# Patient Record
Sex: MALE | Race: BLACK OR AFRICAN AMERICAN | NOT HISPANIC OR LATINO | Employment: OTHER | URBAN - METROPOLITAN AREA
[De-identification: names, ages, dates, MRNs, and addresses within clinical notes are randomized per-mention and may not be internally consistent; named-entity substitution may affect disease eponyms.]

---

## 2017-11-02 ENCOUNTER — HISTORICAL (OUTPATIENT)
Dept: ADMINISTRATIVE | Facility: HOSPITAL | Age: 62
End: 2017-11-02

## 2017-11-02 ENCOUNTER — HISTORICAL (OUTPATIENT)
Dept: INFUSION THERAPY | Facility: HOSPITAL | Age: 62
End: 2017-11-02

## 2017-11-02 LAB
ABS NEUT (OLG): 3.9 X10(3)/MCL (ref 2.1–9.2)
ALBUMIN SERPL-MCNC: 3.5 GM/DL (ref 3.4–5)
ALBUMIN/GLOB SERPL: 1 RATIO (ref 1–2)
ALP SERPL-CCNC: 82 UNIT/L (ref 45–117)
ALT SERPL-CCNC: 19 UNIT/L (ref 12–78)
APPEARANCE, UA: CLEAR
AST SERPL-CCNC: 24 UNIT/L (ref 15–37)
BACTERIA #/AREA URNS AUTO: ABNORMAL /[HPF]
BASOPHILS # BLD AUTO: 0.01 X10(3)/MCL
BASOPHILS NFR BLD AUTO: 0 % (ref 0–1)
BILIRUB SERPL-MCNC: 0.4 MG/DL (ref 0.2–1)
BILIRUB UR QL STRIP: NEGATIVE
BILIRUBIN DIRECT+TOT PNL SERPL-MCNC: 0.2 MG/DL
BILIRUBIN DIRECT+TOT PNL SERPL-MCNC: 0.2 MG/DL
BUN SERPL-MCNC: 19 MG/DL (ref 7–18)
CALCIUM SERPL-MCNC: 9.2 MG/DL (ref 8.5–10.1)
CHLORIDE SERPL-SCNC: 110 MMOL/L (ref 98–107)
CO2 SERPL-SCNC: 23 MMOL/L (ref 21–32)
COLOR UR: ABNORMAL
CREAT SERPL-MCNC: 1.2 MG/DL (ref 0.6–1.3)
CREAT UR-MCNC: 48 MG/DL
CROSSMATCH INTERPRETATION: NORMAL
DEPRECATED CALCIDIOL+CALCIFEROL SERPL-MC: 35.36 NG/ML (ref 30–80)
EOSINOPHIL # BLD AUTO: 0.09 10*3/UL
EOSINOPHIL NFR BLD AUTO: 2 % (ref 0–5)
ERYTHROCYTE [DISTWIDTH] IN BLOOD BY AUTOMATED COUNT: 19.7 % (ref 11.5–14.5)
EST. AVERAGE GLUCOSE BLD GHB EST-MCNC: 134 MG/DL
FERRITIN SERPL-MCNC: 3.2 NG/ML (ref 26–388)
GLOBULIN SER-MCNC: 4.7 GM/ML (ref 2.3–3.5)
GLUCOSE (UA): NORMAL
GLUCOSE SERPL-MCNC: 78 MG/DL (ref 74–106)
HBA1C MFR BLD: 6.3 % (ref 4.2–6.3)
HCT VFR BLD AUTO: 24.5 % (ref 40–51)
HCT VFR BLD AUTO: 27.9 % (ref 40–51)
HGB BLD-MCNC: 6.7 GM/DL (ref 13.5–17.5)
HGB BLD-MCNC: 8.1 GM/DL (ref 13.5–17.5)
HGB UR QL STRIP: NEGATIVE
HYALINE CASTS #/AREA URNS LPF: ABNORMAL /[LPF]
IMM GRANULOCYTES # BLD AUTO: 0.02 10*3/UL
IMM GRANULOCYTES NFR BLD AUTO: 0 %
IRON SATN MFR SERPL: 3.1 % (ref 15–50)
IRON SERPL-MCNC: 16 MCG/DL (ref 65–175)
KETONES UR QL STRIP: NEGATIVE
LEUKOCYTE ESTERASE UR QL STRIP: NEGATIVE
LYMPHOCYTES # BLD AUTO: 1.27 X10(3)/MCL
LYMPHOCYTES NFR BLD AUTO: 21 % (ref 15–40)
MCH RBC QN AUTO: 18.6 PG (ref 26–34)
MCHC RBC AUTO-ENTMCNC: 27.3 GM/DL (ref 31–37)
MCV RBC AUTO: 68.1 FL (ref 80–100)
MICROALBUMIN UR-MCNC: 202 MG/L (ref 0–19)
MICROALBUMIN/CREAT RATIO PNL UR: 420.8 MCG/MG CR (ref 0–29)
MONOCYTES # BLD AUTO: 0.66 X10(3)/MCL
MONOCYTES NFR BLD AUTO: 11 % (ref 4–12)
NEUTROPHILS # BLD AUTO: 3.9 X10(3)/MCL
NEUTROPHILS NFR BLD AUTO: 66 X10(3)/MCL
NITRITE UR QL STRIP: NEGATIVE
PH UR STRIP: 5.5 [PH] (ref 4.5–8)
PLATELET # BLD AUTO: 319 X10(3)/MCL (ref 130–400)
PMV BLD AUTO: 8.9 FL (ref 7.4–10.4)
POTASSIUM SERPL-SCNC: 4.4 MMOL/L (ref 3.5–5.1)
PRODUCT READY: NORMAL
PROT SERPL-MCNC: 8.2 GM/DL (ref 6.4–8.2)
PROT UR QL STRIP: 20 MG/DL
RBC # BLD AUTO: 3.6 X10(6)/MCL (ref 4.5–5.9)
RBC #/AREA URNS AUTO: ABNORMAL /[HPF]
SODIUM SERPL-SCNC: 138 MMOL/L (ref 136–145)
SP GR UR STRIP: 1 (ref 1–1.03)
SQUAMOUS #/AREA URNS LPF: ABNORMAL /[LPF]
TIBC SERPL-MCNC: 523 MCG/DL (ref 250–450)
TRANSFERRIN SERPL-MCNC: 370 MG/DL (ref 200–360)
UROBILINOGEN UR STRIP-ACNC: NORMAL MG/DL
WBC # SPEC AUTO: 6 X10(3)/MCL (ref 4.5–11)
WBC #/AREA URNS AUTO: ABNORMAL /HPF

## 2018-05-03 ENCOUNTER — HISTORICAL (OUTPATIENT)
Dept: ADMINISTRATIVE | Facility: HOSPITAL | Age: 63
End: 2018-05-03

## 2018-05-03 LAB
ABS NEUT (OLG): 1.96 X10(3)/MCL (ref 2.1–9.2)
ALBUMIN SERPL-MCNC: 3.5 GM/DL (ref 3.4–5)
ALBUMIN/GLOB SERPL: 1 RATIO (ref 1–2)
ALP SERPL-CCNC: 92 UNIT/L (ref 45–117)
ALT SERPL-CCNC: 35 UNIT/L (ref 12–78)
AST SERPL-CCNC: 43 UNIT/L (ref 15–37)
BASOPHILS # BLD AUTO: 0.01 X10(3)/MCL
BASOPHILS NFR BLD AUTO: 0 %
BILIRUB SERPL-MCNC: 0.4 MG/DL (ref 0.2–1)
BILIRUBIN DIRECT+TOT PNL SERPL-MCNC: 0.2 MG/DL
BILIRUBIN DIRECT+TOT PNL SERPL-MCNC: 0.2 MG/DL
BUN SERPL-MCNC: 13 MG/DL (ref 7–18)
CALCIUM SERPL-MCNC: 9 MG/DL (ref 8.5–10.1)
CHLORIDE SERPL-SCNC: 108 MMOL/L (ref 98–107)
CO2 SERPL-SCNC: 25 MMOL/L (ref 21–32)
CREAT SERPL-MCNC: 1.4 MG/DL (ref 0.6–1.3)
EOSINOPHIL # BLD AUTO: 0.16 X10(3)/MCL
EOSINOPHIL NFR BLD AUTO: 4 %
ERYTHROCYTE [DISTWIDTH] IN BLOOD BY AUTOMATED COUNT: 16.9 % (ref 11.5–14.5)
ETHANOL SERPL-MCNC: <3 MG/DL
GLOBULIN SER-MCNC: 5.1 GM/ML (ref 2.3–3.5)
GLUCOSE SERPL-MCNC: 113 MG/DL (ref 74–106)
HCT VFR BLD AUTO: 33.5 % (ref 40–51)
HGB BLD-MCNC: 9.7 GM/DL (ref 13.5–17.5)
INR PPP: 1.05 (ref 0.9–1.2)
LYMPHOCYTES # BLD AUTO: 1.27 X10(3)/MCL
LYMPHOCYTES NFR BLD AUTO: 32 % (ref 13–40)
MCH RBC QN AUTO: 21.6 PG (ref 26–34)
MCHC RBC AUTO-ENTMCNC: 29 GM/DL (ref 31–37)
MCV RBC AUTO: 74.4 FL (ref 80–100)
MONOCYTES # BLD AUTO: 0.52 X10(3)/MCL
MONOCYTES NFR BLD AUTO: 13 % (ref 4–12)
NEUTROPHILS # BLD AUTO: 1.96 X10(3)/MCL
NEUTROPHILS NFR BLD AUTO: 50 X10(3)/MCL
PLATELET # BLD AUTO: 341 X10(3)/MCL (ref 130–400)
PMV BLD AUTO: 10.1 FL (ref 7.4–10.4)
POTASSIUM SERPL-SCNC: 4.4 MMOL/L (ref 3.5–5.1)
PROT SERPL-MCNC: 8.6 GM/DL (ref 6.4–8.2)
PROTHROMBIN TIME: 13 SECOND(S) (ref 11.9–14.4)
RBC # BLD AUTO: 4.5 X10(6)/MCL (ref 4.5–5.9)
SODIUM SERPL-SCNC: 137 MMOL/L (ref 136–145)
WBC # SPEC AUTO: 3.9 X10(3)/MCL (ref 4.5–11)

## 2018-11-27 ENCOUNTER — HISTORICAL (OUTPATIENT)
Dept: ADMINISTRATIVE | Facility: HOSPITAL | Age: 63
End: 2018-11-27

## 2018-11-27 LAB
ABS NEUT (OLG): 3.05 X10(3)/MCL (ref 2.1–9.2)
ALBUMIN SERPL-MCNC: 3.1 GM/DL (ref 3.4–5)
ALBUMIN/GLOB SERPL: 1 RATIO (ref 1–2)
ALP SERPL-CCNC: 79 UNIT/L (ref 45–117)
ALT SERPL-CCNC: 17 UNIT/L (ref 12–78)
AST SERPL-CCNC: 14 UNIT/L (ref 15–37)
BASOPHILS # BLD AUTO: 0.02 X10(3)/MCL
BASOPHILS NFR BLD AUTO: 0 %
BILIRUB SERPL-MCNC: 0.2 MG/DL (ref 0.2–1)
BILIRUBIN DIRECT+TOT PNL SERPL-MCNC: <0.1 MG/DL
BILIRUBIN DIRECT+TOT PNL SERPL-MCNC: ABNORMAL MG/DL
BUN SERPL-MCNC: 20 MG/DL (ref 7–18)
CALCIUM SERPL-MCNC: 8.7 MG/DL (ref 8.5–10.1)
CHLORIDE SERPL-SCNC: 110 MMOL/L (ref 98–107)
CO2 SERPL-SCNC: 25 MMOL/L (ref 21–32)
CREAT SERPL-MCNC: 2 MG/DL (ref 0.6–1.3)
EOSINOPHIL # BLD AUTO: 0.25 10*3/UL
EOSINOPHIL NFR BLD AUTO: 4 %
ERYTHROCYTE [DISTWIDTH] IN BLOOD BY AUTOMATED COUNT: 19.2 % (ref 11.5–14.5)
GLOBULIN SER-MCNC: 5.3 GM/ML (ref 2.3–3.5)
GLUCOSE SERPL-MCNC: 99 MG/DL (ref 74–106)
HCT VFR BLD AUTO: 32.8 % (ref 40–51)
HGB BLD-MCNC: 9 GM/DL (ref 13.5–17.5)
IMM GRANULOCYTES # BLD AUTO: 0.01 10*3/UL
IMM GRANULOCYTES NFR BLD AUTO: 0 %
LYMPHOCYTES # BLD AUTO: 1.6 X10(3)/MCL
LYMPHOCYTES NFR BLD AUTO: 28 % (ref 13–40)
MCH RBC QN AUTO: 19.4 PG (ref 26–34)
MCHC RBC AUTO-ENTMCNC: 27.4 GM/DL (ref 31–37)
MCV RBC AUTO: 70.8 FL (ref 80–100)
MONOCYTES # BLD AUTO: 0.82 X10(3)/MCL
MONOCYTES NFR BLD AUTO: 14 % (ref 4–12)
NEUTROPHILS # BLD AUTO: 3.05 X10(3)/MCL
NEUTROPHILS NFR BLD AUTO: 53 X10(3)/MCL
PLATELET # BLD AUTO: 333 X10(3)/MCL (ref 130–400)
PMV BLD AUTO: 9.7 FL (ref 7.4–10.4)
POTASSIUM SERPL-SCNC: 4.9 MMOL/L (ref 3.5–5.1)
PROT SERPL-MCNC: 8.4 GM/DL (ref 6.4–8.2)
RBC # BLD AUTO: 4.63 X10(6)/MCL (ref 4.5–5.9)
SODIUM SERPL-SCNC: 141 MMOL/L (ref 136–145)
WBC # SPEC AUTO: 5.8 X10(3)/MCL (ref 4.5–11)

## 2019-02-28 ENCOUNTER — HISTORICAL (OUTPATIENT)
Dept: ADMINISTRATIVE | Facility: HOSPITAL | Age: 64
End: 2019-02-28

## 2021-08-16 ENCOUNTER — TELEPHONE (OUTPATIENT)
Dept: SURGERY | Facility: CLINIC | Age: 66
End: 2021-08-16

## 2021-08-16 DIAGNOSIS — Z12.11 SCREEN FOR COLON CANCER: Primary | ICD-10-CM

## 2021-09-14 LAB
CALCIUM BLD-MCNC: POSITIVE MG/DL
COVID-19 AB, IGM: NEGATIVE

## 2022-04-10 ENCOUNTER — HISTORICAL (OUTPATIENT)
Dept: ADMINISTRATIVE | Facility: HOSPITAL | Age: 67
End: 2022-04-10
Payer: MEDICAID

## 2022-04-28 VITALS
SYSTOLIC BLOOD PRESSURE: 148 MMHG | HEIGHT: 69 IN | WEIGHT: 190.5 LBS | OXYGEN SATURATION: 99 % | DIASTOLIC BLOOD PRESSURE: 85 MMHG | BODY MASS INDEX: 28.22 KG/M2

## 2022-05-04 NOTE — HISTORICAL [facility] CERNER
This is a historical note converted from Manju. Formatting and pictures may have been removed.  Please reference Manju for original formatting and attached multimedia. Chief Complaint  Patient was referred for hep c  History of Present Illness  63 y/o BM here for initial Hep C visit. Pt is a Hep C GT1a, F3, APRI 0.200, treatment naive patient.? Dx 1 month ago.? 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 He denies IVDU or blood transfusion. Denies alcohol and drug use.? Comorbidities include DM and HTN.? Pt states BP is elevated today, bc he is nervous.? Upon reviewing labs from 10/24/17, it was noted that hgb and hct are very low 7.1/26.3. Pt denies dizziness, SOB, icterus, jaundice, N/V/D, esophageal varices, or abd pain. PT is very eager to start meds. He lives in [place] and comes to clinic via transportation.?  Review of Systems  Constitutional:negative unless stated in HPI  Eye: negative unless stated in HPI  ENMT: negative unless stated in HPI  Respiratory: negative unless stated in HPI  Cardiovascular: negative unless stated in HPI  Gastrointestinal: negative unless stated in HPI  Genitourinary: negative unless stated in HPI  Hema/Lymph: negative unless stated in HPI  Endocrine: negative unless stated in HPI  Immunologic: negative unless stated in HPI  Musculoskeletal: negative unless stated in HPI  Integumentary: negative unless stated in HPI  Neurologic: negative unless stated in HPI  All Other ROS: ?AAOX3, no distress?  Physical Exam  Vitals & Measurements  T:?36.7? ?C ?(Oral)? HR:?65?(Peripheral)? BP:?149/73?  HT:?175.2?cm? HT:?175.2?cm? WT:?79.9?kg? WT:?79.9?kg? BMI:?26.03?  Eye: PERRL, no icterus,?pale conjunctivae bilateral  HENT: bilateral TM normal, normal pharynx pink, no tonsillar edema, no thrush, no sinus tenderness palpated, dental caries noted  Neck: no LAD  Respiratory: CTA, BBS, no SOB, brisk capp refill  Cardiovascular: RRR, s1 s2 noted,?no  chest pain, no edema,  Gastrointestinal: BS + 4 quads, soft NT, ND, neg CVAT bilateral, no organmegaly  Genitourinary: neg  Lymphatics: no ?LAD  Musculoskeletal: COLE well, no deformity  Integumentary: skin intact  Neurologic: CN II-IX intact???  Assessment/Plan  1.?Chronic hepatitis C  Hep C Gt1a, F3, APRI 0.200, treatment naive, new dx  Long discussion regarding available treatment options to include potential risk versus benefits as well as potential adverse effects. Reviewed trial data- excellent cure rates for pangenotypic disease with?Mavyret.? All questions addressed at length. Patient voices understanding and is in agreement to proceed. Will plan to start?Mavyret 3 po q day x 8 weeks?once labs and US are completed.??Refer to PAP for medication assistance if needed. Formal HCV  RN education visit today. Return to clinic 4 weeks after starting meds. HCV  to order all labs and follow-up per HCV protocol.  Ordered:  CBC w/ Auto Diff, Routine collect, *Est. 11/02/17 3:00:00 CDT, Blood, Order for future visit, *Est. Stop date 11/02/17 3:00:00 CDT, Lab Collect, Chronic hepatitis C, On Exactly, 11/02/17 9:12:00 CDT  Clinic Follow up, *Est. 11/09/17 3:00:00 CST, Order for future visit, Chronic hepatitis C  Hypertension  Diabetes mellitus  Anemia  Immunization due, [facility]  Comprehensive Metabolic Panel, Routine collect, *Est. 11/02/17 3:00:00 CDT, Blood, Order for future visit, *Est. Stop date 11/02/17 3:00:00 CDT, Lab Collect, Chronic hepatitis C, On Exactly, 11/02/17 9:12:00 CDT  Office/Outpatient Visit Level 4 Established [id number] PC, Chronic hepatitis C  Hypertension  Diabetes mellitus  Anemia  Immunization due, [facility], 11/02/17 9:13:00 CDT  ?  2.?Hypertension  Low salt diet.? Continue meds.  Ordered:  Clinic Follow up, *Est. 11/09/17 3:00:00 CST, Order for future visit, Chronic hepatitis C  Hypertension  Diabetes mellitus  Anemia  Immunization due, [facility]  Clinic  Office/Outpatient Visit Level 4 Established [id number] PC, Chronic hepatitis C  Hypertension  Diabetes mellitus  Anemia  Immunization due, [facility], 11/02/17 9:13:00 CDT  ?  3.?Diabetes mellitus  Follow a Diabetic diet and continue meds.  Ordered:  Clinic Follow up, *Est. 11/09/17 3:00:00 CST, Order for future visit, Chronic hepatitis C  Hypertension  Diabetes mellitus  Anemia  Immunization due, [facility]  Office/Outpatient Visit Level 4 Established [id number] PC, Chronic hepatitis C  Hypertension  Diabetes mellitus  Anemia  Immunization due, [facility], 11/02/17 9:13:00 CDT  ?  4.?Anemia  Repeat CBC and CMP today  Ordered:  Clinic Follow up, *Est. 11/09/17 3:00:00 CST, Order for future visit, Chronic hepatitis C  Hypertension  Diabetes mellitus  Anemia  Immunization due, [facility]  Office/Outpatient Visit Level 4 Established [id number] PC, Chronic hepatitis C  Hypertension  Diabetes mellitus  Anemia  Immunization due, [facility], 11/02/17 9:13:00 CDT  ?  5.?Immunization due  Ordered:  hepatitis B adult vaccine, 1 mL, form: Injection, IM, Once-Unscheduled, first dose 05/02/18 7:00:00 CDT, Future Order  hepatitis B adult vaccine, 1 mL, form: Injection, IM, Once-Unscheduled, first dose 11/02/17 9:14:00 CDT, Future Order  hepatitis B adult vaccine, 1 mL, form: Injection, IM, Once-Unscheduled, first dose 12/02/17 7:00:00 CST, Future Order  Clinic Follow up, *Est. 11/09/17 3:00:00 CST, Order for future visit, Chronic hepatitis C  Hypertension  Diabetes mellitus  Anemia  Immunization due, [facility]  Office/Outpatient Visit Level 4 Established [id number] PC, Chronic hepatitis C  Hypertension  Diabetes mellitus  Anemia  Immunization due, [facility], 11/02/17 9:13:00 CDT  ?   Problem List/Past Medical History  Ongoing  Diabetes mellitus  Hypertension  Historical  Medications  aspirin 81 mg oral Delayed Release (EC) tablet, 81 mg= 1 tab(s), Oral, Daily  diltiazem 120 mg oral TABlet  (immed. release), 120 mg= 1 tab(s), Oral, TID  GABAPENTIN 300MG CAPSULES, 900 mg, Oral, Daily  glyBURIDE 5 mg oral tablet, 5 mg= 1 tab(s), Oral, Daily  hepatitis B adult vaccine, 1 mL, IM, Once-Unscheduled  hepatitis B adult vaccine, 1 mL, IM, Once-Unscheduled  hepatitis B adult vaccine, 1 mL, IM, Once-Unscheduled  LOSARTAN POTASSIUM 25 MG TA, 25 mg= 1 tab(s), Oral, Daily  omeprazole 20 mg oral DR capsule, 20 mg= 1 cap(s), Oral, Daily  Allergies  lisinopril?(swelling)  Social History  Tobacco - 11/02/2017  Never smoker  Lab Results  10/24/17 HCV VL 1.9, GT 1a, F3, ADAMARIS neg, creatinine 1.25, AST 29, ALT 19, ALK Phos 92, WBC 4.2, hgb 7.1, hct 26.3, TSH 1.09, UDS neg, alcohol <10, Iron 21, transferrin 417, ferritin 9.2, Hep B surface ab NR, hep b surface ag neg, Hep A ab reactive, HIV NR      1040 HGB 6.5. Medicine on call contacted. I spoke with Dr. Mendy Samayoa and Dr. Page.? Dr. Page will assess the patient to see whether he needs to be admitted or not.

## 2022-05-04 NOTE — HISTORICAL [facility] CERNER
This is a historical note converted from [place]. Formatting and pictures may have been removed.  Please reference Manju for original formatting and attached multimedia. Reason for Consultation  ?  Critical H&H  History of Present Illness  ?  This is a 62-year-old white male that I was consulted?by the infectious disease nurse practitioner this morning. ?Patient came in for routine blood work and?to obtain consultation for treatment of chronic inactive hepatitis C. ?He is a native of [place] and originally lives?there as well. ?He?is trying to obtain disability in [place] and uses transportation to?communicate?between direct was [place].? His primary care practitioner is in [place] and the only reason he is in [place] is to obtain?infectious disease consultation.  ?   Upon interview, he states he has adequate help, is compliant with his medicines and that he had a colonoscopy?3-4 years ago which revealed a few polyps which according to him, are all benign. ?Denies any new complaints. ?Especially denies chest pain, exertional dyspnea, shortness of breath, dizziness/syncope,?bright red blood in stools.  ?  Review of Systems  ?  A multisystem review was performed and is negative except as mentioned above  ?  Physical Exam  Vitals & Measurements  T:?36.9? ?C ?(Oral)? HR:?55?(Monitored)? RR:?16? BP:?140/71? WT:?79.9?kg? WT:?79.9?kg?  ?  General : Alert and oriented, No acute distress, afebrile. Pallor appreciated in eyes B/L  Respiratory : Respirations are non-labored and?CTA B/L. Symmetrical air entry bilaterally, no crackles, no wheezes, no rhonchi. No cyanosis, no clubbing.  Cardiovascular : Normal rate, Regular rhythm. No murmurs. Pulses are 2+ throughout.? No edema.?No JVD.?  Gastrointestinal : Soft, nontender, non-distended, bowel sounds are present, no organomegaly, no guarding, no rebound.  Integumentary : Warm, moist, intact.  Neurologic : Alert, Oriented, Cranial Nerves II-XII are grossly  intact. No focal neurologic deficits. No sensory deficit.?No motor deficit.  Psychiatric : Cooperative, Appropriate mood & affect.  ?  Assessment/Plan  ?  1. Chronic Blood Loss Anemia - Source Unknown  2. Chronic Hepatitis C  3. Type II Diabetes Mellitus  4. Primary HTN  ?  This is a middle-aged man with microcytic hypochromic anemia?with an extremely low ferritin.??HIS findings are consistent with chronic blood loss anemia and a complete secondary iron deficiency. ?The patient will need a colonoscopy and malignancy workup.? I have scheduled him to obtain the colonoscopy on 11/8/2017.? He has already attended the education/prep class today?and?verbalizes understanding of the plan. ?Although he has a primary care practitioner is in [place], he would like to follow-up with us here.? Because of his critical H&H today, I will also transfuse him with 2 units of pack red blood cells?and discharged him from the infusion lounge right after that. ?All of his questions and concerns were answered in a language that he seemed to understand.  ?  ?  ?   Problem List/Past Medical History  Ongoing  Diabetes mellitus  Hypertension  Historical  Medications  Inpatient  Benadryl, 25 mg= 0.5 mL, IV Push, Once-Unscheduled, PRN  hepatitis B adult vaccine, 1 mL, IM, Once-Unscheduled  hepatitis B adult vaccine, 1 mL, IM, Once-Unscheduled  Home  aspirin 81 mg oral Delayed Release (EC) tablet, 81 mg= 1 tab(s), Oral, Daily  diltiazem 120 mg oral TABlet (immed. release), 120 mg= 1 tab(s), Oral, TID  ferrous fumarate 324 mg (106 mg elemental iron) oral tablet, 324 mg= 1 tab(s), Oral, Daily, 3 refills  GABAPENTIN 300MG CAPSULES, 900 mg, Oral, Daily  glyBURIDE 5 mg oral tablet, 5 mg= 1 tab(s), Oral, Daily  LOSARTAN POTASSIUM 25 MG TA, 25 mg= 1 tab(s), Oral, Daily  omeprazole 20 mg oral DR capsule, 20 mg= 1 cap(s), Oral, Daily  Allergies  lisinopril?(swelling)  Social History  Alcohol - 11/02/2017  Current, Beer, 1-2 times per week  Tobacco -  11/02/2017  Never smoker      I reviewed past medical history , lab data, and radiographic findings  Case discussed with Residents. I agree with assessment and plan of care.

## 2022-12-19 DIAGNOSIS — N18.6 ENCOUNTER REGARDING VASCULAR ACCESS FOR DIALYSIS FOR END-STAGE RENAL DISEASE: ICD-10-CM

## 2022-12-19 DIAGNOSIS — Z99.2 ENCOUNTER REGARDING VASCULAR ACCESS FOR DIALYSIS FOR END-STAGE RENAL DISEASE: ICD-10-CM

## 2022-12-19 DIAGNOSIS — N18.6 ESRD (END STAGE RENAL DISEASE): Primary | ICD-10-CM

## 2022-12-19 NOTE — PROGRESS NOTES
Referral received- patient notified vein mapping should be completed prior to appointment scheduled 12/29/22. Advised hospital should be contacting him to schedule vein mapping.

## 2022-12-27 DIAGNOSIS — Z99.2 ENCOUNTER REGARDING VASCULAR ACCESS FOR DIALYSIS FOR END-STAGE RENAL DISEASE: ICD-10-CM

## 2022-12-27 DIAGNOSIS — N18.6 ESRD (END STAGE RENAL DISEASE): Primary | ICD-10-CM

## 2022-12-27 DIAGNOSIS — N18.6 ENCOUNTER REGARDING VASCULAR ACCESS FOR DIALYSIS FOR END-STAGE RENAL DISEASE: ICD-10-CM

## 2023-01-20 ENCOUNTER — OFFICE VISIT (OUTPATIENT)
Dept: CARDIOTHORACIC SURGERY | Facility: CLINIC | Age: 68
End: 2023-01-20
Payer: MEDICARE

## 2023-01-20 VITALS
BODY MASS INDEX: 21.92 KG/M2 | WEIGHT: 148 LBS | RESPIRATION RATE: 18 BRPM | DIASTOLIC BLOOD PRESSURE: 85 MMHG | OXYGEN SATURATION: 97 % | TEMPERATURE: 99 F | HEART RATE: 56 BPM | SYSTOLIC BLOOD PRESSURE: 147 MMHG

## 2023-01-20 DIAGNOSIS — I10 HYPERTENSION, UNSPECIFIED TYPE: ICD-10-CM

## 2023-01-20 DIAGNOSIS — Z79.4 TYPE 2 DIABETES MELLITUS WITH CHRONIC KIDNEY DISEASE ON CHRONIC DIALYSIS, WITH LONG-TERM CURRENT USE OF INSULIN: ICD-10-CM

## 2023-01-20 DIAGNOSIS — E11.22 TYPE 2 DIABETES MELLITUS WITH CHRONIC KIDNEY DISEASE ON CHRONIC DIALYSIS, WITH LONG-TERM CURRENT USE OF INSULIN: ICD-10-CM

## 2023-01-20 DIAGNOSIS — N18.6 TYPE 2 DIABETES MELLITUS WITH CHRONIC KIDNEY DISEASE ON CHRONIC DIALYSIS, WITH LONG-TERM CURRENT USE OF INSULIN: ICD-10-CM

## 2023-01-20 DIAGNOSIS — Z99.2 TYPE 2 DIABETES MELLITUS WITH CHRONIC KIDNEY DISEASE ON CHRONIC DIALYSIS, WITH LONG-TERM CURRENT USE OF INSULIN: ICD-10-CM

## 2023-01-20 DIAGNOSIS — N18.6 END STAGE RENAL DISEASE: Primary | ICD-10-CM

## 2023-01-20 PROBLEM — E11.29 TYPE 2 DIABETES MELLITUS WITH KIDNEY COMPLICATION, WITH LONG-TERM CURRENT USE OF INSULIN: Status: ACTIVE | Noted: 2023-01-20

## 2023-01-20 PROCEDURE — 3079F PR MOST RECENT DIASTOLIC BLOOD PRESSURE 80-89 MM HG: ICD-10-PCS | Mod: CPTII,S$GLB,, | Performed by: SURGERY

## 2023-01-20 PROCEDURE — 3008F PR BODY MASS INDEX (BMI) DOCUMENTED: ICD-10-PCS | Mod: CPTII,S$GLB,, | Performed by: SURGERY

## 2023-01-20 PROCEDURE — 99203 OFFICE O/P NEW LOW 30 MIN: CPT | Mod: S$GLB,,, | Performed by: SURGERY

## 2023-01-20 PROCEDURE — 3077F SYST BP >= 140 MM HG: CPT | Mod: CPTII,S$GLB,, | Performed by: SURGERY

## 2023-01-20 PROCEDURE — 3077F PR MOST RECENT SYSTOLIC BLOOD PRESSURE >= 140 MM HG: ICD-10-PCS | Mod: CPTII,S$GLB,, | Performed by: SURGERY

## 2023-01-20 PROCEDURE — 3008F BODY MASS INDEX DOCD: CPT | Mod: CPTII,S$GLB,, | Performed by: SURGERY

## 2023-01-20 PROCEDURE — 99203 PR OFFICE/OUTPT VISIT, NEW, LEVL III, 30-44 MIN: ICD-10-PCS | Mod: S$GLB,,, | Performed by: SURGERY

## 2023-01-20 PROCEDURE — 3079F DIAST BP 80-89 MM HG: CPT | Mod: CPTII,S$GLB,, | Performed by: SURGERY

## 2023-01-20 NOTE — PROGRESS NOTES
[place] - Vascular Surgery  History & Physical    Subjective:     Chief Complaint/Reason for Clinic Visit: Permanent dialysis access    History of Present Illness:  68 yo right handed M, Type 2 DM - insulin dependent, and HTN presents for evaluation for permanent dialysis access. He is right handed. He denies any prior pacemakers. He is not smoking currently.     Outpatient Medications:   Amlodipine 10 mg  Metoprolol 50 mg   Cetirizine 10 mg  Pravastatin 20 mg   Clonidine 0.2 mg  Prilosec 20 mg  Tamsulosin 0.4 mg   Insulin 10U subcutaneous QD  Losartan 100 mg QD     Review of patient's allergies indicates:   Allergen Reactions    Lisinopril Anaphylaxis       Past Medical History:   Diagnosis Date    Hypertension     Type 2 diabetes mellitus with unspecified diabetic retinopathy without macular edema      No past surgical history on file.  Family History    None       Tobacco Use    Smoking status: Former     Types: Cigarettes    Smokeless tobacco: Not on file   Substance and Sexual Activity    Alcohol use: Not Currently    Drug use: Not Currently    Sexual activity: Not on file     Review of Systems   Constitutional:  Negative for chills and fever.   Respiratory:  Negative for shortness of breath.    Cardiovascular:  Negative for chest pain.   Gastrointestinal:  Negative for abdominal distention, diarrhea, nausea and vomiting.   Neurological:  Negative for weakness, light-headedness and numbness.   Objective:   Vital signs stable - HR: 56, BP: 147/85, RR: 18  Weight: 67.1 kg (148 lb)  Body mass index is 21.92 kg/m².    Physical Exam  Constitutional:       General: He is not in acute distress.     Appearance: Normal appearance. He is not ill-appearing.   HENT:      Head: Normocephalic and atraumatic.      Nose: No congestion or rhinorrhea.   Cardiovascular:      Rate and Rhythm: Normal rate and regular rhythm.      Pulses: Normal pulses.           Radial pulses are 2+ on the right side and 2+ on the left side.    Pulmonary:      Effort: Pulmonary effort is normal. No respiratory distress.      Breath sounds: Normal breath sounds. No wheezing.   Neurological:      General: No focal deficit present.      Mental Status: He is alert and oriented to person, place, and time. Mental status is at baseline.   Psychiatric:         Mood and Affect: Mood normal.         Behavior: Behavior normal.         Thought Content: Thought content normal.     Reviewed vein mapping:   Appears to have adequate size R basilic vein in the upper arm   Appears to have adequate size L basilic vein in the upper arm    No adequate size basilic vein or cephalic vein in the right forearm   No adequate size basilic vein or cephalic vein in the left forearm     Assessment/Plan:   66 yo right handed M with past medical history of ESRD on HD via R IJ TDC T, Th, and Saturday, Type 2 DM - insulin dependent, and HTN presents for evaluation for permanent dialysis access.     On exam, patient appears to have prominent R cephalic vein. Discussed with the patient that this would be a single stage operation as opposed to using the basilic vein which would require two operations.     - Will plan to do RUE AVF creation next Wednesday 01/25/2023  - Patient to return to clinic on Monday for pre-operative work-up     Heriberto Salomon MD  General Surgery  [facility] - Cardiothoracic Surgery

## 2023-01-23 RX ORDER — LOSARTAN POTASSIUM 100 MG/1
100 TABLET ORAL DAILY
COMMUNITY

## 2023-01-23 RX ORDER — CLONIDINE HYDROCHLORIDE 0.2 MG/1
0.2 TABLET ORAL 3 TIMES DAILY
COMMUNITY

## 2023-01-23 RX ORDER — FERROUS SULFATE 325(65) MG
325 TABLET ORAL
COMMUNITY
End: 2024-03-20

## 2023-01-23 RX ORDER — TRAMADOL HYDROCHLORIDE 50 MG/1
50 TABLET ORAL 2 TIMES DAILY
COMMUNITY
End: 2024-03-20

## 2023-01-23 RX ORDER — CETIRIZINE HYDROCHLORIDE 10 MG/1
10 TABLET ORAL DAILY
COMMUNITY
End: 2024-03-20

## 2023-01-23 RX ORDER — TAMSULOSIN HYDROCHLORIDE 0.4 MG/1
CAPSULE ORAL DAILY
COMMUNITY

## 2023-01-23 RX ORDER — OMEPRAZOLE 20 MG/1
20 CAPSULE, DELAYED RELEASE ORAL DAILY
COMMUNITY
End: 2024-03-20

## 2023-01-23 RX ORDER — AMLODIPINE BESYLATE 10 MG/1
10 TABLET ORAL DAILY
COMMUNITY
End: 2024-03-20

## 2023-01-23 RX ORDER — PRAVASTATIN SODIUM 20 MG/1
20 TABLET ORAL DAILY
COMMUNITY
End: 2024-03-20

## 2023-01-23 RX ORDER — METOPROLOL SUCCINATE 50 MG/1
50 TABLET, EXTENDED RELEASE ORAL DAILY
COMMUNITY
End: 2024-03-20

## 2023-01-25 ENCOUNTER — OUTSIDE PLACE OF SERVICE (OUTPATIENT)
Dept: CARDIOTHORACIC SURGERY | Facility: CLINIC | Age: 68
End: 2023-01-25

## 2023-01-25 PROCEDURE — 36821 PR ANASTOMOSIS,AV,ANY SITE: ICD-10-PCS | Mod: ,,, | Performed by: SURGERY

## 2023-01-25 PROCEDURE — 36821 AV FUSION DIRECT ANY SITE: CPT | Mod: ,,, | Performed by: SURGERY

## 2023-02-09 ENCOUNTER — OFFICE VISIT (OUTPATIENT)
Dept: CARDIOTHORACIC SURGERY | Facility: CLINIC | Age: 68
End: 2023-02-09
Payer: MEDICARE

## 2023-02-09 VITALS
BODY MASS INDEX: 20.74 KG/M2 | HEART RATE: 66 BPM | OXYGEN SATURATION: 98 % | DIASTOLIC BLOOD PRESSURE: 72 MMHG | SYSTOLIC BLOOD PRESSURE: 116 MMHG | WEIGHT: 140 LBS

## 2023-02-09 DIAGNOSIS — Q27.31 ARTERIOVENOUS MALFORMATION OF VESSEL OF UPPER LIMB: ICD-10-CM

## 2023-02-09 DIAGNOSIS — Z98.890 S/P ARTERIOVENOUS (AV) FISTULA CREATION: Primary | ICD-10-CM

## 2023-02-09 PROCEDURE — 3008F BODY MASS INDEX DOCD: CPT | Mod: CPTII,S$GLB,, | Performed by: SURGERY

## 2023-02-09 PROCEDURE — 4010F PR ACE/ARB THEARPY RXD/TAKEN: ICD-10-PCS | Mod: CPTII,S$GLB,, | Performed by: SURGERY

## 2023-02-09 PROCEDURE — 3078F DIAST BP <80 MM HG: CPT | Mod: CPTII,S$GLB,, | Performed by: SURGERY

## 2023-02-09 PROCEDURE — 3008F PR BODY MASS INDEX (BMI) DOCUMENTED: ICD-10-PCS | Mod: CPTII,S$GLB,, | Performed by: SURGERY

## 2023-02-09 PROCEDURE — 3074F SYST BP LT 130 MM HG: CPT | Mod: CPTII,S$GLB,, | Performed by: SURGERY

## 2023-02-09 PROCEDURE — 3078F PR MOST RECENT DIASTOLIC BLOOD PRESSURE < 80 MM HG: ICD-10-PCS | Mod: CPTII,S$GLB,, | Performed by: SURGERY

## 2023-02-09 PROCEDURE — 3074F PR MOST RECENT SYSTOLIC BLOOD PRESSURE < 130 MM HG: ICD-10-PCS | Mod: CPTII,S$GLB,, | Performed by: SURGERY

## 2023-02-09 PROCEDURE — 99024 PR POST-OP FOLLOW-UP VISIT: ICD-10-PCS | Mod: S$GLB,,, | Performed by: SURGERY

## 2023-02-09 PROCEDURE — 99024 POSTOP FOLLOW-UP VISIT: CPT | Mod: S$GLB,,, | Performed by: SURGERY

## 2023-02-09 PROCEDURE — 4010F ACE/ARB THERAPY RXD/TAKEN: CPT | Mod: CPTII,S$GLB,, | Performed by: SURGERY

## 2023-02-09 NOTE — PROGRESS NOTES
[place] - Vascular Surgery  Follow-up Visit       Subjective:     Chief Complaint/Reason for Clinic Visit: Follow-up after RUE brachiocephalic arteriovenous fistula creation     History of Present Illness:  66 yo right handed M with past medical history of ESRD on HD via R [facility] T, Th, and Saturday, Type 2 DM - insulin dependent, and HTN presents to clinic for follow-up after creation of RUE brachiocephalic arteriovenous fistula on 01/25/2023. He presents today for follow-up.    Overall, doing well. No complaints.    Current Outpatient Medications on File Prior to Visit   Medication Sig Dispense Refill    amLODIPine (NORVASC) 10 MG tablet Take 10 mg by mouth once daily.      cetirizine (ZYRTEC) 10 MG tablet Take 10 mg by mouth once daily.      cloNIDine (CATAPRES) 0.2 MG tablet Take 0.2 mg by mouth 3 (three) times daily.      ferrous sulfate (FEOSOL) 325 mg (65 mg iron) Tab tablet Take 325 mg by mouth daily with breakfast.      folic acid/multivit-min/lutein (CENTRUM SILVER ORAL) Take by mouth.      losartan (COZAAR) 100 MG tablet Take 100 mg by mouth once daily.      metoprolol succinate (TOPROL-XL) 50 MG 24 hr tablet Take 50 mg by mouth once daily.      omeprazole (PRILOSEC) 20 MG capsule Take 20 mg by mouth once daily.      pravastatin (PRAVACHOL) 20 MG tablet Take 20 mg by mouth once daily.      tamsulosin (FLOMAX) 0.4 mg Cap Take by mouth once daily.      traMADoL (ULTRAM) 50 mg tablet Take 50 mg by mouth 2 (two) times a day.       No current facility-administered medications on file prior to visit.       Review of patient's allergies indicates:  No Known Allergies  Past Medical History:   Diagnosis Date    Hypertension     Type 2 diabetes mellitus with unspecified diabetic retinopathy without macular edema        No past surgical history on file.    No family history on file.    Social History     Socioeconomic History    Marital status:    Tobacco Use    Smoking status: Former     Types:  Cigarettes   Substance and Sexual Activity    Alcohol use: Not Currently    Drug use: Not Currently         Objective:   HR: 66  PO2: 95%  BP: 116/72    Physical Exam  General: Well-appearing; no acute distress   Lungs: Unlabored respirations on room air  Cardio: Regular rate and rhythm    Extremities: Well healed R antecubital incision; palpable thrill over the cephalic vein.       Assessment/Plan:   68 yo right handed M with past medical history of ESRD on HD via R IJ TDC T, Th, and Saturday, Type 2 DM - insulin dependent, and HTN presents to clinic for follow-up after creation of RUE brachiocephalic arteriovenous fistula on 01/25/2023. He presents today for follow-up.    - Follow-up in 1 month with duplex of the right upper extremity arteriovenous fistula to assess for maturity  - Discussed to continue with regular right upper extremity exercises to aid in maturity of the fistula     Heriberto Salomon MD  Vascular Surgery

## 2023-03-02 ENCOUNTER — TELEPHONE (OUTPATIENT)
Dept: CARDIOTHORACIC SURGERY | Facility: CLINIC | Age: 68
End: 2023-03-02
Payer: MEDICARE

## 2023-03-02 NOTE — TELEPHONE ENCOUNTER
Notified pt MD still wants to see him tomorrow  ----- Message from Herminia Diamond sent at 3/2/2023  2:34 PM CST -----  Contact: self  Pt states that he has a pulse again, pt states that he had dialysis today pls call [phone] no pulse was found earlier but all has resume to normal rhythm

## 2023-03-03 ENCOUNTER — OFFICE VISIT (OUTPATIENT)
Dept: CARDIOTHORACIC SURGERY | Facility: CLINIC | Age: 68
End: 2023-03-03
Payer: MEDICARE

## 2023-03-03 VITALS
HEIGHT: 66 IN | TEMPERATURE: 98 F | SYSTOLIC BLOOD PRESSURE: 126 MMHG | WEIGHT: 129 LBS | DIASTOLIC BLOOD PRESSURE: 75 MMHG | HEART RATE: 62 BPM | OXYGEN SATURATION: 95 % | BODY MASS INDEX: 20.73 KG/M2

## 2023-03-03 DIAGNOSIS — T82.898A ARTERIOVENOUS FISTULA OCCLUSION, INITIAL ENCOUNTER: Primary | ICD-10-CM

## 2023-03-03 DIAGNOSIS — Z99.2 DIALYSIS PATIENT: ICD-10-CM

## 2023-03-03 PROCEDURE — 99024 PR POST-OP FOLLOW-UP VISIT: ICD-10-PCS | Mod: S$GLB,,, | Performed by: SURGERY

## 2023-03-03 PROCEDURE — 4010F ACE/ARB THERAPY RXD/TAKEN: CPT | Mod: CPTII,S$GLB,, | Performed by: SURGERY

## 2023-03-03 PROCEDURE — 3078F PR MOST RECENT DIASTOLIC BLOOD PRESSURE < 80 MM HG: ICD-10-PCS | Mod: CPTII,S$GLB,, | Performed by: SURGERY

## 2023-03-03 PROCEDURE — 3008F PR BODY MASS INDEX (BMI) DOCUMENTED: ICD-10-PCS | Mod: CPTII,S$GLB,, | Performed by: SURGERY

## 2023-03-03 PROCEDURE — 3078F DIAST BP <80 MM HG: CPT | Mod: CPTII,S$GLB,, | Performed by: SURGERY

## 2023-03-03 PROCEDURE — 1159F PR MEDICATION LIST DOCUMENTED IN MEDICAL RECORD: ICD-10-PCS | Mod: CPTII,S$GLB,, | Performed by: SURGERY

## 2023-03-03 PROCEDURE — 4010F PR ACE/ARB THEARPY RXD/TAKEN: ICD-10-PCS | Mod: CPTII,S$GLB,, | Performed by: SURGERY

## 2023-03-03 PROCEDURE — 99024 POSTOP FOLLOW-UP VISIT: CPT | Mod: S$GLB,,, | Performed by: SURGERY

## 2023-03-03 PROCEDURE — 3074F PR MOST RECENT SYSTOLIC BLOOD PRESSURE < 130 MM HG: ICD-10-PCS | Mod: CPTII,S$GLB,, | Performed by: SURGERY

## 2023-03-03 PROCEDURE — 1159F MED LIST DOCD IN RCRD: CPT | Mod: CPTII,S$GLB,, | Performed by: SURGERY

## 2023-03-03 PROCEDURE — 3074F SYST BP LT 130 MM HG: CPT | Mod: CPTII,S$GLB,, | Performed by: SURGERY

## 2023-03-03 PROCEDURE — 1125F PR PAIN SEVERITY QUANTIFIED, PAIN PRESENT: ICD-10-PCS | Mod: CPTII,S$GLB,, | Performed by: SURGERY

## 2023-03-03 PROCEDURE — 1125F AMNT PAIN NOTED PAIN PRSNT: CPT | Mod: CPTII,S$GLB,, | Performed by: SURGERY

## 2023-03-03 PROCEDURE — 3008F BODY MASS INDEX DOCD: CPT | Mod: CPTII,S$GLB,, | Performed by: SURGERY

## 2023-03-03 NOTE — PROGRESS NOTES
[place] - Vascular Surgery  Follow-up Visit       Subjective:     Chief Complaint/Reason for Clinic Visit:  Follow-up after right brachiocephalic arteriovenous fistula patient on January 25, 2023    History of Present Illness:  67-year-old gentleman with history of end-stage renal disease on hemodialysis via right IJ tunneled dialysis catheter presented to clinic for evaluation for permanent dialysis access.  Patient underwent right brachiocephalic arteriovenous fistula creation on January 25, 2023.  Dialysis center called yesterday reporting that they were unable to feel a good thrill or hear a good bruit through the patient's right upper extremity arteriovenous fistula.  Patient followed up in clinic today.    Patient overall feeling well.    Current Outpatient Medications on File Prior to Visit   Medication Sig Dispense Refill    amLODIPine (NORVASC) 10 MG tablet Take 10 mg by mouth once daily.      cetirizine (ZYRTEC) 10 MG tablet Take 10 mg by mouth once daily.      cloNIDine (CATAPRES) 0.2 MG tablet Take 0.2 mg by mouth 3 (three) times daily.      ferrous sulfate (FEOSOL) 325 mg (65 mg iron) Tab tablet Take 325 mg by mouth daily with breakfast.      folic acid/multivit-min/lutein (CENTRUM SILVER ORAL) Take by mouth.      losartan (COZAAR) 100 MG tablet Take 100 mg by mouth once daily.      metoprolol succinate (TOPROL-XL) 50 MG 24 hr tablet Take 50 mg by mouth once daily.      omeprazole (PRILOSEC) 20 MG capsule Take 20 mg by mouth once daily.      pravastatin (PRAVACHOL) 20 MG tablet Take 20 mg by mouth once daily.      tamsulosin (FLOMAX) 0.4 mg Cap Take by mouth once daily.      traMADoL (ULTRAM) 50 mg tablet Take 50 mg by mouth 2 (two) times a day.       No current facility-administered medications on file prior to visit.       Review of patient's allergies indicates:  No Known Allergies  Past Medical History:   Diagnosis Date    Hypertension     Type 2 diabetes mellitus with unspecified diabetic  "retinopathy without macular edema        History reviewed. No pertinent surgical history.    History reviewed. No pertinent family history.    Social History     Socioeconomic History    Marital status:    Tobacco Use    Smoking status: Former     Types: Cigarettes   Substance and Sexual Activity    Alcohol use: Not Currently    Drug use: Not Currently       Review of Systems   Constitutional:  Negative for chills and fever.   Cardiovascular:  Negative for chest pain and palpitations.   Gastrointestinal:  Negative for abdominal pain, constipation, diarrhea, nausea and vomiting.   Genitourinary:  Negative for dysuria and urgency.     Objective:   /75 (BP Location: Left arm, Patient Position: Sitting, BP Method: Medium (Automatic))   Pulse 62   Temp 98.1 °F (36.7 °C) (Oral)   Ht 5' 6" (1.676 m)   Wt 58.5 kg (129 lb)   SpO2 95%   BMI 20.82 kg/m²     Physical Exam  Constitutional:       General: He is not in acute distress.     Appearance: Normal appearance. He is normal weight.   HENT:      Head: Normocephalic and atraumatic.   Cardiovascular:      Rate and Rhythm: Normal rate.      Comments: Patient with palpable pulse over the right upper extremity AV fistula.  Pulmonary:      Effort: Pulmonary effort is normal. No respiratory distress.      Breath sounds: Normal breath sounds.   Abdominal:      General: Abdomen is flat. Bowel sounds are normal.      Palpations: Abdomen is soft.   Neurological:      General: No focal deficit present.      Mental Status: He is alert and oriented to person, place, and time. Mental status is at baseline.   Psychiatric:         Mood and Affect: Mood normal.         Behavior: Behavior normal.         Thought Content: Thought content normal.       Assessment/Plan:   67-year-old gentleman presents to clinic for follow-up after right brachiocephalic arteriovenous fistula creation on January 25, 2023.  Patient returns to clinic due to concern from the dialysis center that " they are unable to palpate a thrill or hear a bruit over the fistula.  Patient had ultrasound performed on February 22, 2023 that showed that the fistula was patent with size being around 4.5 mm.    Partial thrombosis of the fistula.  Unable to palpate a good thrill.  There is a pulse which is consistent with outflow stenosis.  Under ultrasound examination in the office, there appears to be partial thrombosis of the fistula.  There is still some flow.      Discussed with the patient that there are 2 options.  One option being trying to revise this AV fistula understanding that now with the partial thrombosis this fistula is at high risk for failure.  Option 2 being that we attempt AV fistula creation on the left arm.    Patient agrees with attempting AV fistula creation on the left arm.  In reviewing the patient's vein mapping, patient would be a candidate for left brachiobasilic AV fistula creation.      -Plan for left upper extremity AV fistula creation on March 17, 2023   -Patient will return to clinic for preoperative assessment and consent      Heriberto Salomon MD  Vascular Surgery

## 2023-03-17 ENCOUNTER — OUTSIDE PLACE OF SERVICE (OUTPATIENT)
Dept: CARDIOTHORACIC SURGERY | Facility: CLINIC | Age: 68
End: 2023-03-17

## 2023-03-17 PROCEDURE — 36821 PR ANASTOMOSIS,AV,ANY SITE: ICD-10-PCS | Mod: 79,,, | Performed by: SURGERY

## 2023-03-17 PROCEDURE — 36821 AV FUSION DIRECT ANY SITE: CPT | Mod: 79,,, | Performed by: SURGERY

## 2023-03-30 ENCOUNTER — TELEPHONE (OUTPATIENT)
Dept: CARDIOTHORACIC SURGERY | Facility: CLINIC | Age: 68
End: 2023-03-30
Payer: MEDICARE

## 2023-03-30 NOTE — TELEPHONE ENCOUNTER
Pt scheduled for post op and Misbah notified    ----- Message from Carmen Hernandez sent at 3/30/2023 11:33 AM CDT -----  Regarding: Appointment  Contact: ashwini Crawley  Per phone call with Ashwini, she would like for the patient to be schedule for a follow up appointment after surgery on 03/17/2023.  Please return call at [phone] and to the patient at [phone] (home).    Thanks,  SJ

## 2023-04-24 ENCOUNTER — OFFICE VISIT (OUTPATIENT)
Dept: CARDIOTHORACIC SURGERY | Facility: CLINIC | Age: 68
End: 2023-04-24
Payer: MEDICARE

## 2023-04-24 VITALS
HEART RATE: 87 BPM | SYSTOLIC BLOOD PRESSURE: 151 MMHG | DIASTOLIC BLOOD PRESSURE: 69 MMHG | WEIGHT: 136.63 LBS | OXYGEN SATURATION: 94 % | RESPIRATION RATE: 18 BRPM | BODY MASS INDEX: 22.05 KG/M2

## 2023-04-24 DIAGNOSIS — Z98.890 S/P ARTERIOVENOUS (AV) FISTULA CREATION: Primary | ICD-10-CM

## 2023-04-24 PROCEDURE — 99024 POSTOP FOLLOW-UP VISIT: CPT | Mod: S$GLB,,, | Performed by: SURGERY

## 2023-04-24 PROCEDURE — 3078F DIAST BP <80 MM HG: CPT | Mod: CPTII,S$GLB,, | Performed by: SURGERY

## 2023-04-24 PROCEDURE — 3008F BODY MASS INDEX DOCD: CPT | Mod: CPTII,S$GLB,, | Performed by: SURGERY

## 2023-04-24 PROCEDURE — 1125F PR PAIN SEVERITY QUANTIFIED, PAIN PRESENT: ICD-10-PCS | Mod: CPTII,S$GLB,, | Performed by: SURGERY

## 2023-04-24 PROCEDURE — 3078F PR MOST RECENT DIASTOLIC BLOOD PRESSURE < 80 MM HG: ICD-10-PCS | Mod: CPTII,S$GLB,, | Performed by: SURGERY

## 2023-04-24 PROCEDURE — 3008F PR BODY MASS INDEX (BMI) DOCUMENTED: ICD-10-PCS | Mod: CPTII,S$GLB,, | Performed by: SURGERY

## 2023-04-24 PROCEDURE — 3077F SYST BP >= 140 MM HG: CPT | Mod: CPTII,S$GLB,, | Performed by: SURGERY

## 2023-04-24 PROCEDURE — 4010F PR ACE/ARB THEARPY RXD/TAKEN: ICD-10-PCS | Mod: CPTII,S$GLB,, | Performed by: SURGERY

## 2023-04-24 PROCEDURE — 99024 PR POST-OP FOLLOW-UP VISIT: ICD-10-PCS | Mod: S$GLB,,, | Performed by: SURGERY

## 2023-04-24 PROCEDURE — 1125F AMNT PAIN NOTED PAIN PRSNT: CPT | Mod: CPTII,S$GLB,, | Performed by: SURGERY

## 2023-04-24 PROCEDURE — 3077F PR MOST RECENT SYSTOLIC BLOOD PRESSURE >= 140 MM HG: ICD-10-PCS | Mod: CPTII,S$GLB,, | Performed by: SURGERY

## 2023-04-24 PROCEDURE — 4010F ACE/ARB THERAPY RXD/TAKEN: CPT | Mod: CPTII,S$GLB,, | Performed by: SURGERY

## 2023-04-24 NOTE — PROGRESS NOTES
[facility] [place] - Vascular Surgery  Follow-up Visit       Subjective:     Chief Complaint/Reason for Clinic Visit:  Follow-up after left brachiobasilic arteriovenous fistula creation    History of Present Illness:  67-year-old gentleman with history of end-stage renal disease on hemodialysis via right IJ tunneled dialysis catheter presents to clinic for follow-up after left brachiobasilic arteriovenous fistula creation on March 17, 2023.  Patient overall denies any complaints.  Doing well.    Current Outpatient Medications on File Prior to Visit   Medication Sig Dispense Refill    amLODIPine (NORVASC) 10 MG tablet Take 10 mg by mouth once daily.      cetirizine (ZYRTEC) 10 MG tablet Take 10 mg by mouth once daily.      cloNIDine (CATAPRES) 0.2 MG tablet Take 0.2 mg by mouth 3 (three) times daily.      ferrous sulfate (FEOSOL) 325 mg (65 mg iron) Tab tablet Take 325 mg by mouth daily with breakfast.      folic acid/multivit-min/lutein (CENTRUM SILVER ORAL) Take by mouth.      losartan (COZAAR) 100 MG tablet Take 100 mg by mouth once daily.      metoprolol succinate (TOPROL-XL) 50 MG 24 hr tablet Take 50 mg by mouth once daily.      omeprazole (PRILOSEC) 20 MG capsule Take 20 mg by mouth once daily.      pravastatin (PRAVACHOL) 20 MG tablet Take 20 mg by mouth once daily.      tamsulosin (FLOMAX) 0.4 mg Cap Take by mouth once daily.      traMADoL (ULTRAM) 50 mg tablet Take 50 mg by mouth 2 (two) times a day.       No current facility-administered medications on file prior to visit.       Review of patient's allergies indicates:  No Known Allergies  Past Medical History:   Diagnosis Date    Hypertension     Type 2 diabetes mellitus with unspecified diabetic retinopathy without macular edema        Past Surgical History:   Procedure Laterality Date    DIALYSIS FISTULA CREATION  03/17/2023       No family history on file.    Social History     Socioeconomic History    Marital status:    Tobacco Use    Smoking  status: Former     Types: Cigarettes   Substance and Sexual Activity    Alcohol use: Not Currently    Drug use: Not Currently       Objective:   BP (!) 151/69   Pulse 87   Resp 18   Wt 62 kg (136 lb 9.6 oz)   SpO2 (!) 94%   BMI 22.05 kg/m²     Physical Exam  Constitutional:       General: He is not in acute distress.     Appearance: Normal appearance. He is normal weight.   HENT:      Head: Normocephalic and atraumatic.   Cardiovascular:      Rate and Rhythm: Normal rate and regular rhythm.      Pulses:           Radial pulses are 2+ on the left side.      Comments: Palpable thrill over left upper extremity arteriovenous fistula.  Pulmonary:      Effort: Pulmonary effort is normal. No respiratory distress.      Breath sounds: Normal breath sounds.   Abdominal:      General: Abdomen is flat. Bowel sounds are normal. There is no distension.      Palpations: Abdomen is soft.   Skin:         Neurological:      General: No focal deficit present.      Mental Status: He is alert and oriented to person, place, and time. Mental status is at baseline.   Psychiatric:         Mood and Affect: Mood normal.         Behavior: Behavior normal.         Thought Content: Thought content normal.       Assessment/Plan:   67-year-old gentleman with history of end-stage renal disease on hemodialysis via right IJ tunneled dialysis catheter presents to clinic for follow-up after left brachiobasilic arteriovenous fistula creation on March 17, 2023.  Patient overall denies any complaints.  Doing well.    Palpable thrill over arteriovenous fistula.  Wound healing well.      - Will have the patient follow-up in 1 month for evaluation and planning for superficialization versus transposition of the brachiobasilic arteriovenous fistula   - Answered patient's questions to his satisfaction.  Patient expressed understanding of the above plan    Heriberto Salomon MD  Vascular Surgery

## 2023-06-12 ENCOUNTER — OFFICE VISIT (OUTPATIENT)
Dept: CARDIOTHORACIC SURGERY | Facility: CLINIC | Age: 68
End: 2023-06-12
Payer: MEDICARE

## 2023-06-12 VITALS
HEART RATE: 84 BPM | DIASTOLIC BLOOD PRESSURE: 72 MMHG | RESPIRATION RATE: 18 BRPM | OXYGEN SATURATION: 97 % | WEIGHT: 152 LBS | SYSTOLIC BLOOD PRESSURE: 148 MMHG | BODY MASS INDEX: 24.53 KG/M2

## 2023-06-12 DIAGNOSIS — Z98.890 S/P ARTERIOVENOUS (AV) FISTULA CREATION: Primary | ICD-10-CM

## 2023-06-12 PROCEDURE — 1126F PR PAIN SEVERITY QUANTIFIED, NO PAIN PRESENT: ICD-10-PCS | Mod: CPTII,S$GLB,, | Performed by: SURGERY

## 2023-06-12 PROCEDURE — 1126F AMNT PAIN NOTED NONE PRSNT: CPT | Mod: CPTII,S$GLB,, | Performed by: SURGERY

## 2023-06-12 PROCEDURE — 1159F PR MEDICATION LIST DOCUMENTED IN MEDICAL RECORD: ICD-10-PCS | Mod: CPTII,S$GLB,, | Performed by: SURGERY

## 2023-06-12 PROCEDURE — 3008F PR BODY MASS INDEX (BMI) DOCUMENTED: ICD-10-PCS | Mod: CPTII,S$GLB,, | Performed by: SURGERY

## 2023-06-12 PROCEDURE — 1159F MED LIST DOCD IN RCRD: CPT | Mod: CPTII,S$GLB,, | Performed by: SURGERY

## 2023-06-12 PROCEDURE — 99024 PR POST-OP FOLLOW-UP VISIT: ICD-10-PCS | Mod: S$GLB,,, | Performed by: SURGERY

## 2023-06-12 PROCEDURE — 3077F SYST BP >= 140 MM HG: CPT | Mod: CPTII,S$GLB,, | Performed by: SURGERY

## 2023-06-12 PROCEDURE — 3078F DIAST BP <80 MM HG: CPT | Mod: CPTII,S$GLB,, | Performed by: SURGERY

## 2023-06-12 PROCEDURE — 4010F PR ACE/ARB THEARPY RXD/TAKEN: ICD-10-PCS | Mod: CPTII,S$GLB,, | Performed by: SURGERY

## 2023-06-12 PROCEDURE — 3077F PR MOST RECENT SYSTOLIC BLOOD PRESSURE >= 140 MM HG: ICD-10-PCS | Mod: CPTII,S$GLB,, | Performed by: SURGERY

## 2023-06-12 PROCEDURE — 3008F BODY MASS INDEX DOCD: CPT | Mod: CPTII,S$GLB,, | Performed by: SURGERY

## 2023-06-12 PROCEDURE — 99024 POSTOP FOLLOW-UP VISIT: CPT | Mod: S$GLB,,, | Performed by: SURGERY

## 2023-06-12 PROCEDURE — 4010F ACE/ARB THERAPY RXD/TAKEN: CPT | Mod: CPTII,S$GLB,, | Performed by: SURGERY

## 2023-06-12 PROCEDURE — 3078F PR MOST RECENT DIASTOLIC BLOOD PRESSURE < 80 MM HG: ICD-10-PCS | Mod: CPTII,S$GLB,, | Performed by: SURGERY

## 2023-06-12 NOTE — PROGRESS NOTES
[facility] - Vascular Surgery  History and Physical      Subjective:     Chief Complaint/Reason for Clinic Visit:  Follow-up after 1st stage left brachiobasilic arteriovenous fistula creation    History of Present Illness:  67-year-old gentleman with history of end-stage renal disease on hemodialysis via right IJ tunneled dialysis catheter Tuesday, Thursday, and Saturday underwent first-stage left brachiobasilic arteriovenous fistula creation on March 17, 2023.  Patient presents to clinic now for follow-up.    Patient without any complaints.  Overall doing well.    Current Outpatient Medications on File Prior to Visit   Medication Sig Dispense Refill    amLODIPine (NORVASC) 10 MG tablet Take 10 mg by mouth once daily.      cetirizine (ZYRTEC) 10 MG tablet Take 10 mg by mouth once daily.      cloNIDine (CATAPRES) 0.2 MG tablet Take 0.2 mg by mouth 3 (three) times daily.      ferrous sulfate (FEOSOL) 325 mg (65 mg iron) Tab tablet Take 325 mg by mouth daily with breakfast.      folic acid/multivit-min/lutein (CENTRUM SILVER ORAL) Take by mouth.      losartan (COZAAR) 100 MG tablet Take 100 mg by mouth once daily.      metoprolol succinate (TOPROL-XL) 50 MG 24 hr tablet Take 50 mg by mouth once daily.      omeprazole (PRILOSEC) 20 MG capsule Take 20 mg by mouth once daily.      pravastatin (PRAVACHOL) 20 MG tablet Take 20 mg by mouth once daily.      tamsulosin (FLOMAX) 0.4 mg Cap Take by mouth once daily.      traMADoL (ULTRAM) 50 mg tablet Take 50 mg by mouth 2 (two) times a day.       No current facility-administered medications on file prior to visit.       Review of patient's allergies indicates:  No Known Allergies  Past Medical History:   Diagnosis Date    Hypertension     Type 2 diabetes mellitus with unspecified diabetic retinopathy without macular edema        Past Surgical History:   Procedure Laterality Date    DIALYSIS FISTULA CREATION  03/17/2023       No family history on file.    Social History      Socioeconomic History    Marital status:    Tobacco Use    Smoking status: Former     Types: Cigarettes   Substance and Sexual Activity    Alcohol use: Not Currently    Drug use: Not Currently       Review of Systems   Constitutional:  Negative for chills, fever, malaise/fatigue and weight loss.   Respiratory:  Negative for cough and shortness of breath.    Cardiovascular:  Negative for chest pain and palpitations.   Gastrointestinal:  Negative for abdominal pain, constipation, diarrhea, nausea and vomiting.   Genitourinary:  Negative for dysuria, frequency and urgency.     Objective:   BP (!) 148/72   Pulse 84   Resp 18   Wt 68.9 kg (152 lb)   SpO2 97%   BMI 24.53 kg/m²     Physical Exam  Constitutional:       General: He is not in acute distress.     Appearance: Normal appearance. He is normal weight.   HENT:      Head: Normocephalic and atraumatic.   Cardiovascular:      Rate and Rhythm: Normal rate and regular rhythm.      Comments: Palpable thrill over left brachiobasilic arteriovenous fistula  Pulmonary:      Effort: Pulmonary effort is normal. No respiratory distress.   Abdominal:      General: Abdomen is flat. Bowel sounds are normal.      Palpations: Abdomen is soft.   Neurological:      General: No focal deficit present.      Mental Status: He is alert and oriented to person, place, and time. Mental status is at baseline.   Psychiatric:         Mood and Affect: Mood normal.         Behavior: Behavior normal.         Thought Content: Thought content normal.     Reviewed clinic ultrasound-overall it appears that the fistula measures between 5-7 mm in size.  The flow through the fistula is noted to be greater than 1000 mL/min.  There is a small area just proximal to the anastomosis that measures approximately 5 mm in size.    Assessment/Plan:   67-year-old gentleman with history of end-stage renal disease on hemodialysis via right IJ tunneled dialysis catheter Tuesday, Thursday, and Saturday  underwent first-stage left brachiobasilic arteriovenous fistula creation on March 17, 2023.  Patient presents to clinic now for follow-up.    Clinic ultrasound showed mature fistula.     -We will schedule patient for 2nd stage left brachiobasilic on Wednesday June 6th, 2023   -Patient will return to clinic for pre-operative work-up and labwork  -Answered patient's questions to his satisfaction. Patient expressed understanding of the above plan.     Heriberto Salomon MD  Vascular Surgery

## 2023-06-30 ENCOUNTER — CLINICAL SUPPORT (OUTPATIENT)
Dept: CARDIOTHORACIC SURGERY | Facility: CLINIC | Age: 68
End: 2023-06-30
Payer: MEDICARE

## 2023-06-30 DIAGNOSIS — N18.6 END STAGE RENAL DISEASE: Primary | ICD-10-CM

## 2023-06-30 DIAGNOSIS — N18.6 ESRD (END STAGE RENAL DISEASE): Primary | ICD-10-CM

## 2023-06-30 LAB
ABS NRBC COUNT: 0 THOU/UL (ref 0–0.01)
ABSOLUTE BASOPHIL: 0 10*3/UL (ref 0–0.3)
ABSOLUTE EOSINOPHIL: 0.1 10*3/UL (ref 0–0.6)
ABSOLUTE IMMATURE GRAN: 0.01 THOU/UL (ref 0–0.03)
ABSOLUTE LYMPHOCYTE: 0.9 10*3/UL (ref 1.2–4)
ABSOLUTE MONOCYTE: 0.5 10*3/UL (ref 0.1–0.8)
ALBUMIN SERPL BCP-MCNC: 3.5 G/DL (ref 3.4–5)
ALP SERPL-CCNC: 71 U/L (ref 45–117)
ALT SERPL W P-5'-P-CCNC: 13 U/L (ref 16–61)
ANION GAP SERPL CALC-SCNC: 4 MMOL/L (ref 3–11)
APPEARANCE, UA: CLEAR
APTT PPP: 35.3 SEC (ref 25.8–38.6)
AST SERPL-CCNC: 12 U/L (ref 15–37)
BACTERIA SPEC CULT: ABNORMAL /HPF
BASOPHILS NFR BLD: 0.4 % (ref 0–3)
BILIRUB SERPL-MCNC: 0.3 MG/DL (ref 0.2–1)
BILIRUB UR QL STRIP: NEGATIVE
BUN SERPL-MCNC: 33 MG/DL (ref 7–18)
BUN/CREAT SERPL: 3.82 RATIO
CALCIUM SERPL-MCNC: 9.7 MG/DL (ref 8.5–10.1)
CHLORIDE SERPL-SCNC: 101 MMOL/L (ref 98–107)
CO2 SERPL-SCNC: 29 MMOL/L (ref 21–32)
COLOR UR: YELLOW
CREAT SERPL-MCNC: 8.62 MG/DL (ref 0.7–1.3)
EOSINOPHIL NFR BLD: 2.7 % (ref 0–6)
ERYTHROCYTE [DISTWIDTH] IN BLOOD BY AUTOMATED COUNT: 12.7 % (ref 0–15.5)
ESTIMATED AVG GLUCOSE: 115 MG/DL
GFR ESTIMATION: 8
GLUCOSE (UA): NEGATIVE MG/DL
GLUCOSE SERPL-MCNC: 105 MG/DL (ref 74–106)
HBA1C MFR BLD: 5.4 % (ref 4.2–6.3)
HCT VFR BLD AUTO: 33.8 % (ref 42–52)
HGB BLD-MCNC: 10.9 G/DL (ref 14–18)
HGB UR QL STRIP: NEGATIVE
IMMATURE GRANULOCYTES: 0.2 % (ref 0–0.43)
INR PPP: 1.1 INR (ref 0.9–1.1)
KETONES UR QL STRIP: NEGATIVE MG/DL
LEUKOCYTE ESTERASE UR QL STRIP: NEGATIVE LEU/UL
LYMPHOCYTES NFR BLD: 18.6 % (ref 20–45)
MCH RBC QN AUTO: 31.1 PG (ref 27–32)
MCHC RBC AUTO-ENTMCNC: 32.2 % (ref 32–36)
MCV RBC AUTO: 96.3 FL (ref 80–97)
MONOCYTES NFR BLD: 10.2 % (ref 2–10)
MUCUS URINE: ABNORMAL /LPF
NEUTROPHILS # BLD AUTO: 3.3 10*3/UL (ref 1.4–7)
NEUTROPHILS NFR BLD: 67.9 % (ref 50–80)
NITRITE UR QL STRIP: NEGATIVE
NUCLEATED RED BLOOD CELLS: 0 % (ref 0–0.2)
PH UR STRIP: 7.5 PH (ref 5–8)
PLATELETS: 235 10*3/UL (ref 130–400)
PMV BLD AUTO: 9.1 FL (ref 9.2–12.2)
POTASSIUM SERPL-SCNC: 3.9 MMOL/L (ref 3.5–5.1)
PROT SERPL-MCNC: 7.9 G/DL (ref 6.4–8.2)
PROT UR QL STRIP: 200 MG/DL
PROTHROMBIN TIME: 12.7 SEC (ref 10.2–12.9)
RBC # BLD AUTO: 3.51 10*6/UL (ref 4.7–6.1)
RBC #/AREA URNS HPF: ABNORMAL /HPF (ref 0–2)
RENAL EPITHELIAL, MICROSCOPIC: ABNORMAL /HPF
SERVICE COMMENT 03: ABNORMAL
SODIUM BLD-SCNC: 134 MMOL/L (ref 131–143)
SP GR UR STRIP: 1.01 (ref 1–1.03)
SQUAMOUS EPITHELIAL, UA: ABNORMAL /LPF
TSH SERPL DL<=0.005 MIU/L-ACNC: 1.86 UIU/ML (ref 0.36–3.74)
UROBILINOGEN UR STRIP-ACNC: NORMAL MG/DL
WBC # BLD: 4.9 10*3/UL (ref 4.5–10)
WBC #/AREA URNS HPF: ABNORMAL /HPF (ref 0–2)

## 2023-07-05 LAB
GLUCOSE SERPL-MCNC: 100 MG/DL (ref 70–105)
GLUCOSE SERPL-MCNC: 80 MG/DL (ref 70–105)
POTASSIUM SERPL-SCNC: 4 MMOL/L (ref 3.5–5.1)

## 2023-07-17 ENCOUNTER — OFFICE VISIT (OUTPATIENT)
Dept: CARDIOTHORACIC SURGERY | Facility: CLINIC | Age: 68
End: 2023-07-17
Payer: MEDICARE

## 2023-07-17 DIAGNOSIS — N18.6 ESRD (END STAGE RENAL DISEASE): Primary | ICD-10-CM

## 2023-07-17 PROCEDURE — 4010F ACE/ARB THERAPY RXD/TAKEN: CPT | Mod: CPTII,S$GLB,, | Performed by: SURGERY

## 2023-07-17 PROCEDURE — 99214 PR OFFICE/OUTPT VISIT, EST, LEVL IV, 30-39 MIN: ICD-10-PCS | Mod: S$GLB,,, | Performed by: SURGERY

## 2023-07-17 PROCEDURE — 4010F PR ACE/ARB THEARPY RXD/TAKEN: ICD-10-PCS | Mod: CPTII,S$GLB,, | Performed by: SURGERY

## 2023-07-17 PROCEDURE — 3044F HG A1C LEVEL LT 7.0%: CPT | Mod: CPTII,S$GLB,, | Performed by: SURGERY

## 2023-07-17 PROCEDURE — 3044F PR MOST RECENT HEMOGLOBIN A1C LEVEL <7.0%: ICD-10-PCS | Mod: CPTII,S$GLB,, | Performed by: SURGERY

## 2023-07-17 PROCEDURE — 99214 OFFICE O/P EST MOD 30 MIN: CPT | Mod: S$GLB,,, | Performed by: SURGERY

## 2023-07-17 NOTE — PROGRESS NOTES
[facility] - Vascular Surgery  History and Physical      Subjective:     Chief Complaint/Reason for Clinic Visit:  Follow-up after 1st stage left brachiobasilic arteriovenous fistula creation    History of Present Illness:  67-year-old gentleman with history of end-stage renal disease on hemodialysis via right IJ tunneled dialysis catheter Tuesday, Thursday, and Saturday underwent first-stage left brachiobasilic arteriovenous fistula creation on March 17, 2023.  Patient presents to clinic now for follow-up.    Patient without any complaints.  Overall doing well.    Clinic 7/17/23S/P AV fistula 2nd stage on 7/5/23. Presents for a wound check. Dressing from surgery is   Pealing off. He denies any fever or chills. He denies any drainage.  Current Outpatient Medications on File Prior to Visit   Medication Sig Dispense Refill    amLODIPine (NORVASC) 10 MG tablet Take 10 mg by mouth once daily.      cetirizine (ZYRTEC) 10 MG tablet Take 10 mg by mouth once daily.      cloNIDine (CATAPRES) 0.2 MG tablet Take 0.2 mg by mouth 3 (three) times daily.      ferrous sulfate (FEOSOL) 325 mg (65 mg iron) Tab tablet Take 325 mg by mouth daily with breakfast.      folic acid/multivit-min/lutein (CENTRUM SILVER ORAL) Take by mouth.      losartan (COZAAR) 100 MG tablet Take 100 mg by mouth once daily.      metoprolol succinate (TOPROL-XL) 50 MG 24 hr tablet Take 50 mg by mouth once daily.      omeprazole (PRILOSEC) 20 MG capsule Take 20 mg by mouth once daily.      pravastatin (PRAVACHOL) 20 MG tablet Take 20 mg by mouth once daily.      tamsulosin (FLOMAX) 0.4 mg Cap Take by mouth once daily.      traMADoL (ULTRAM) 50 mg tablet Take 50 mg by mouth 2 (two) times a day.       No current facility-administered medications on file prior to visit.       Review of patient's allergies indicates:  No Known Allergies  Past Medical History:   Diagnosis Date    Hypertension     Type 2 diabetes mellitus with unspecified diabetic retinopathy  without macular edema        Past Surgical History:   Procedure Laterality Date    DIALYSIS FISTULA CREATION  03/17/2023       No family history on file.    Social History     Socioeconomic History    Marital status:    Tobacco Use    Smoking status: Former     Types: Cigarettes   Substance and Sexual Activity    Alcohol use: Not Currently    Drug use: Not Currently       Review of Systems   Constitutional:  Negative for chills, fever, malaise/fatigue and weight loss.   HENT:  Negative for sinus pain.    Eyes:  Negative for blurred vision and photophobia.   Respiratory:  Negative for cough and shortness of breath.    Cardiovascular:  Negative for chest pain and palpitations.   Gastrointestinal:  Negative for abdominal pain, constipation, diarrhea, nausea and vomiting.   Genitourinary:  Negative for dysuria, frequency and urgency.   Musculoskeletal:  Negative for back pain, joint pain and myalgias.   Skin:  Negative for itching and rash.   Neurological:  Negative for tingling and speech change.     Objective:   There were no vitals taken for this visit.    Physical Exam  Constitutional:       General: He is not in acute distress.     Appearance: Normal appearance. He is normal weight.   HENT:      Head: Normocephalic and atraumatic.      Mouth/Throat:      Mouth: Mucous membranes are moist.   Eyes:      Pupils: Pupils are equal, round, and reactive to light.   Cardiovascular:      Rate and Rhythm: Normal rate and regular rhythm.      Comments: Palpable thrill over left brachiobasilic arteriovenous fistula  Pulmonary:      Effort: Pulmonary effort is normal. No respiratory distress.   Abdominal:      General: Abdomen is flat. Bowel sounds are normal.      Palpations: Abdomen is soft.   Musculoskeletal:         General: Normal range of motion.   Skin:     General: Skin is warm.      Comments: Former surgical dressing removed. Patient tolerated well. No erythema or drainage   Neurological:      General: No focal  deficit present.      Mental Status: He is alert and oriented to person, place, and time. Mental status is at baseline.   Psychiatric:         Mood and Affect: Mood normal.         Behavior: Behavior normal.         Thought Content: Thought content normal.     Reviewed clinic ultrasound-overall it appears that the fistula measures between 5-7 mm in size.  The flow through the fistula is noted to be greater than 1000 mL/min.  There is a small area just proximal to the anastomosis that measures approximately 5 mm in size.    Assessment/Plan:   67-year-old gentleman with history of end-stage renal disease on hemodialysis via right IJ tunneled dialysis catheter Tuesday, Thursday, and Saturday underwent first-stage left brachiobasilic arteriovenous fistula creation on March 17, 2023.  Patient presents to clinic now for follow-up.    -Doing well. Incision healing well. No evidence of any infection.   -Scheduled for a 4 week f/u with u/s of fistula    Heriberto Salomon MD  Vascular Surgery

## 2023-07-19 ENCOUNTER — OUTSIDE PLACE OF SERVICE (OUTPATIENT)
Dept: CARDIOTHORACIC SURGERY | Facility: CLINIC | Age: 68
End: 2023-07-19
Payer: MEDICARE

## 2023-07-19 PROCEDURE — 36832 AV FISTULA REVISION OPEN: CPT | Mod: ,,, | Performed by: SURGERY

## 2023-07-19 PROCEDURE — 36832 PR AV FISTULA REVISION, OPEN, W/O THROMBECTOMY: ICD-10-PCS | Mod: ,,, | Performed by: SURGERY

## 2023-08-15 ENCOUNTER — TELEPHONE (OUTPATIENT)
Dept: CARDIOTHORACIC SURGERY | Facility: CLINIC | Age: 68
End: 2023-08-15

## 2023-08-15 NOTE — TELEPHONE ENCOUNTER
----- Message from Jose Alberto Maya sent at 8/15/2023 11:36 AM CDT -----  Contact: Misbah/ Muna Crawley is needing a call back in regards to the patient. Please give her a call back at [phone]

## 2023-08-24 ENCOUNTER — OFFICE VISIT (OUTPATIENT)
Dept: CARDIOTHORACIC SURGERY | Facility: CLINIC | Age: 68
End: 2023-08-24
Payer: MEDICARE

## 2023-08-24 VITALS
DIASTOLIC BLOOD PRESSURE: 66 MMHG | HEART RATE: 70 BPM | BODY MASS INDEX: 24.91 KG/M2 | SYSTOLIC BLOOD PRESSURE: 135 MMHG | HEIGHT: 66 IN | RESPIRATION RATE: 16 BRPM | WEIGHT: 155 LBS | OXYGEN SATURATION: 100 %

## 2023-08-24 DIAGNOSIS — N18.6 ESRD (END STAGE RENAL DISEASE): Primary | ICD-10-CM

## 2023-08-24 PROCEDURE — 4010F PR ACE/ARB THEARPY RXD/TAKEN: ICD-10-PCS | Mod: CPTII,S$GLB,, | Performed by: SURGERY

## 2023-08-24 PROCEDURE — 99024 POSTOP FOLLOW-UP VISIT: CPT | Mod: S$GLB,,, | Performed by: SURGERY

## 2023-08-24 PROCEDURE — 1159F MED LIST DOCD IN RCRD: CPT | Mod: CPTII,S$GLB,, | Performed by: SURGERY

## 2023-08-24 PROCEDURE — 3008F BODY MASS INDEX DOCD: CPT | Mod: CPTII,S$GLB,, | Performed by: SURGERY

## 2023-08-24 PROCEDURE — 3008F PR BODY MASS INDEX (BMI) DOCUMENTED: ICD-10-PCS | Mod: CPTII,S$GLB,, | Performed by: SURGERY

## 2023-08-24 PROCEDURE — 1159F PR MEDICATION LIST DOCUMENTED IN MEDICAL RECORD: ICD-10-PCS | Mod: CPTII,S$GLB,, | Performed by: SURGERY

## 2023-08-24 PROCEDURE — 99024 PR POST-OP FOLLOW-UP VISIT: ICD-10-PCS | Mod: S$GLB,,, | Performed by: SURGERY

## 2023-08-24 PROCEDURE — 3044F PR MOST RECENT HEMOGLOBIN A1C LEVEL <7.0%: ICD-10-PCS | Mod: CPTII,S$GLB,, | Performed by: SURGERY

## 2023-08-24 PROCEDURE — 3075F PR MOST RECENT SYSTOLIC BLOOD PRESS GE 130-139MM HG: ICD-10-PCS | Mod: CPTII,S$GLB,, | Performed by: SURGERY

## 2023-08-24 PROCEDURE — 3075F SYST BP GE 130 - 139MM HG: CPT | Mod: CPTII,S$GLB,, | Performed by: SURGERY

## 2023-08-24 PROCEDURE — 4010F ACE/ARB THERAPY RXD/TAKEN: CPT | Mod: CPTII,S$GLB,, | Performed by: SURGERY

## 2023-08-24 PROCEDURE — 3044F HG A1C LEVEL LT 7.0%: CPT | Mod: CPTII,S$GLB,, | Performed by: SURGERY

## 2023-08-24 PROCEDURE — 3078F PR MOST RECENT DIASTOLIC BLOOD PRESSURE < 80 MM HG: ICD-10-PCS | Mod: CPTII,S$GLB,, | Performed by: SURGERY

## 2023-08-24 PROCEDURE — 3078F DIAST BP <80 MM HG: CPT | Mod: CPTII,S$GLB,, | Performed by: SURGERY

## 2023-08-30 ENCOUNTER — TELEPHONE (OUTPATIENT)
Dept: CARDIOTHORACIC SURGERY | Facility: CLINIC | Age: 68
End: 2023-08-30
Payer: MEDICARE

## 2023-08-30 NOTE — TELEPHONE ENCOUNTER
----- Message from Saige Gusman sent at 8/30/2023 10:29 AM CDT -----  Type:  Needs Medical Advice    Who Called: [facility]  Symptoms (please be specific): -   How long has patient had these symptoms:  -  Pharmacy name and phone #:  -  Would the patient rather a call back or a response via [facility]?    Best Call Back Number: [phone]  Additional Information:  needs orders for Cannulation of the Access please call ( pt coming in @5a tomorrow morning

## 2023-09-14 DIAGNOSIS — N18.6 ESRD (END STAGE RENAL DISEASE): Primary | ICD-10-CM

## 2023-09-18 ENCOUNTER — OFFICE VISIT (OUTPATIENT)
Dept: CARDIOTHORACIC SURGERY | Facility: CLINIC | Age: 68
End: 2023-09-18
Payer: MEDICARE

## 2023-09-18 VITALS
OXYGEN SATURATION: 96 % | HEART RATE: 69 BPM | HEIGHT: 66 IN | WEIGHT: 154.5 LBS | RESPIRATION RATE: 18 BRPM | SYSTOLIC BLOOD PRESSURE: 142 MMHG | DIASTOLIC BLOOD PRESSURE: 84 MMHG | BODY MASS INDEX: 24.83 KG/M2

## 2023-09-18 DIAGNOSIS — N18.6 ESRD (END STAGE RENAL DISEASE): Primary | ICD-10-CM

## 2023-09-18 PROCEDURE — 3044F HG A1C LEVEL LT 7.0%: CPT | Mod: CPTII,S$GLB,, | Performed by: SURGERY

## 2023-09-18 PROCEDURE — 99024 PR POST-OP FOLLOW-UP VISIT: ICD-10-PCS | Mod: S$GLB,,, | Performed by: SURGERY

## 2023-09-18 PROCEDURE — 3044F PR MOST RECENT HEMOGLOBIN A1C LEVEL <7.0%: ICD-10-PCS | Mod: CPTII,S$GLB,, | Performed by: SURGERY

## 2023-09-18 PROCEDURE — 4010F PR ACE/ARB THEARPY RXD/TAKEN: ICD-10-PCS | Mod: CPTII,S$GLB,, | Performed by: SURGERY

## 2023-09-18 PROCEDURE — 3008F BODY MASS INDEX DOCD: CPT | Mod: CPTII,S$GLB,, | Performed by: SURGERY

## 2023-09-18 PROCEDURE — 1126F AMNT PAIN NOTED NONE PRSNT: CPT | Mod: CPTII,S$GLB,, | Performed by: SURGERY

## 2023-09-18 PROCEDURE — 1159F PR MEDICATION LIST DOCUMENTED IN MEDICAL RECORD: ICD-10-PCS | Mod: CPTII,S$GLB,, | Performed by: SURGERY

## 2023-09-18 PROCEDURE — 99024 POSTOP FOLLOW-UP VISIT: CPT | Mod: S$GLB,,, | Performed by: SURGERY

## 2023-09-18 PROCEDURE — 3079F DIAST BP 80-89 MM HG: CPT | Mod: CPTII,S$GLB,, | Performed by: SURGERY

## 2023-09-18 PROCEDURE — 1126F PR PAIN SEVERITY QUANTIFIED, NO PAIN PRESENT: ICD-10-PCS | Mod: CPTII,S$GLB,, | Performed by: SURGERY

## 2023-09-18 PROCEDURE — 3079F PR MOST RECENT DIASTOLIC BLOOD PRESSURE 80-89 MM HG: ICD-10-PCS | Mod: CPTII,S$GLB,, | Performed by: SURGERY

## 2023-09-18 PROCEDURE — 4010F ACE/ARB THERAPY RXD/TAKEN: CPT | Mod: CPTII,S$GLB,, | Performed by: SURGERY

## 2023-09-18 PROCEDURE — 3077F SYST BP >= 140 MM HG: CPT | Mod: CPTII,S$GLB,, | Performed by: SURGERY

## 2023-09-18 PROCEDURE — 1159F MED LIST DOCD IN RCRD: CPT | Mod: CPTII,S$GLB,, | Performed by: SURGERY

## 2023-09-18 PROCEDURE — 3008F PR BODY MASS INDEX (BMI) DOCUMENTED: ICD-10-PCS | Mod: CPTII,S$GLB,, | Performed by: SURGERY

## 2023-09-18 PROCEDURE — 3077F PR MOST RECENT SYSTOLIC BLOOD PRESSURE >= 140 MM HG: ICD-10-PCS | Mod: CPTII,S$GLB,, | Performed by: SURGERY

## 2023-09-18 NOTE — PROGRESS NOTES
[facility] - Vascular Surgery  Follow-up Visit     Subjective:     Chief Complaint/Reason for Clinic Visit:  Follow-up to evaluate left upper extremity arteriovenous fistula    History of Present Illness:  67-year-old gentleman underwent left brachiobasilic arteriovenous fistula creation with superficialization on July 5th 2023 presents now for follow-up. He was last seen in office on 8/24/23 was was felt that AVF was ready to be assessed on 8/29/23.     9/18/23:  Patient is in our office today for follow-up after AV fistula use.  Stated nursing staff attempted to access AV fistula 4 times at dialysis unit and was only successfully access 2 times.  Once accessed, there was no issues.    Current Outpatient Medications on File Prior to Visit   Medication Sig Dispense Refill    amLODIPine (NORVASC) 10 MG tablet Take 10 mg by mouth once daily.      cetirizine (ZYRTEC) 10 MG tablet Take 10 mg by mouth once daily.      cloNIDine (CATAPRES) 0.2 MG tablet Take 0.2 mg by mouth 3 (three) times daily.      ferrous sulfate (FEOSOL) 325 mg (65 mg iron) Tab tablet Take 325 mg by mouth daily with breakfast.      folic acid/multivit-min/lutein (CENTRUM SILVER ORAL) Take by mouth.      losartan (COZAAR) 100 MG tablet Take 100 mg by mouth once daily.      metoprolol succinate (TOPROL-XL) 50 MG 24 hr tablet Take 50 mg by mouth once daily.      omeprazole (PRILOSEC) 20 MG capsule Take 20 mg by mouth once daily.      pravastatin (PRAVACHOL) 20 MG tablet Take 20 mg by mouth once daily.      tamsulosin (FLOMAX) 0.4 mg Cap Take by mouth once daily.      traMADoL (ULTRAM) 50 mg tablet Take 50 mg by mouth 2 (two) times a day.       No current facility-administered medications on file prior to visit.       Review of patient's allergies indicates:  No Known Allergies  Past Medical History:   Diagnosis Date    Hypertension     Type 2 diabetes mellitus with unspecified diabetic retinopathy without macular edema        Past Surgical History:    Procedure Laterality Date    DIALYSIS FISTULA CREATION  03/17/2023       No family history on file.    Social History     Socioeconomic History    Marital status:    Tobacco Use    Smoking status: Former     Types: Cigarettes    Smokeless tobacco: Never   Substance and Sexual Activity    Alcohol use: Not Currently    Drug use: Not Currently       Review of Systems   Constitutional:  Negative for chills and fever.   Respiratory:  Negative for shortness of breath.    Cardiovascular:  Negative for chest pain.   Gastrointestinal:  Negative for abdominal pain, constipation, diarrhea, nausea and vomiting.       Objective:   There were no vitals taken for this visit.    Physical Exam  Constitutional:       General: He is not in acute distress.     Appearance: Normal appearance. He is normal weight.   HENT:      Head: Normocephalic and atraumatic.   Cardiovascular:      Rate and Rhythm: Normal rate and regular rhythm.   Pulmonary:      Effort: Pulmonary effort is normal. No respiratory distress.      Breath sounds: Normal breath sounds.   Abdominal:      General: Abdomen is flat. Bowel sounds are normal. There is no distension.      Palpations: Abdomen is soft.   Skin:     General: Skin is warm and dry.      Comments: Left upper arm AV fistula:  Incision is healed.  No swelling, redness, or drainage.+ thrills.   Neurological:      General: No focal deficit present.      Mental Status: He is alert and oriented to person, place, and time. Mental status is at baseline.   Psychiatric:         Mood and Affect: Mood normal.         Behavior: Behavior normal.         Thought Content: Thought content normal.     Reviewed clinic ultrasound that showed appropriate flow volumes throughout arteriovenous fistula    Assessment/Plan:   67-year-old gentleman presents to clinic to evaluate maturity of left upper extremity brachiobasilic arteriovenous fistula.  He was last seen in office on 8/24/23 was was felt that AVF was ready  to be assessed on 8/29/23. He's here today for f/u AVF after access is used.There have been some difficulties accessing the AV fistula. Ultrasound shows appropriate flow volumes but it does also show there is a slight area of narrowing the mid aspect of the AV fistula.     - Patient has positive thrill to AV fistula.  Ultrasound of the fistula showed patent fistula with mild narrowing of the mid portion of fistula with mildly increased velocity.  Recommend to have fistulogram by Interventional Radiology.  However, patient prefers to try to use access couple of more times before going forward IR consult for fistulogram.  He will call our office to let us know if he needs to have AV fistula ordered.  - Follow-up in clinic as needed  - Answered patient's questions to his satisfaction.  Patient expressed understanding of the above plan.    Heriberto Salomon MD  Vascular Surgery

## 2023-09-28 ENCOUNTER — TELEPHONE (OUTPATIENT)
Dept: CARDIOTHORACIC SURGERY | Facility: CLINIC | Age: 68
End: 2023-09-28
Payer: MEDICARE

## 2023-09-28 NOTE — TELEPHONE ENCOUNTER
----- Message from Aishwarya Vivar LPN sent at 9/26/2023  5:34 PM CDT -----  Contact: [facility] Rivas    ----- Message -----  From: Rosemary Persaud  Sent: 9/26/2023  10:03 AM CDT  To: [facility] Staff    Type: Staff Message  Caller: iMsbah Rivas  Call Back Number: [phone]  Nature of the Call: pt is requesting and ultrasound be schedule for him. Refusing cannulated access  Additional Information: na

## 2023-10-26 DIAGNOSIS — N18.6 ESRD (END STAGE RENAL DISEASE): Primary | ICD-10-CM

## 2023-11-06 ENCOUNTER — OFFICE VISIT (OUTPATIENT)
Dept: CARDIOTHORACIC SURGERY | Facility: CLINIC | Age: 68
End: 2023-11-06
Payer: MEDICARE

## 2023-11-06 VITALS
BODY MASS INDEX: 25.63 KG/M2 | WEIGHT: 159.5 LBS | HEIGHT: 66 IN | OXYGEN SATURATION: 96 % | SYSTOLIC BLOOD PRESSURE: 175 MMHG | DIASTOLIC BLOOD PRESSURE: 83 MMHG | RESPIRATION RATE: 20 BRPM | HEART RATE: 100 BPM

## 2023-11-06 DIAGNOSIS — N18.6 ESRD (END STAGE RENAL DISEASE): Primary | ICD-10-CM

## 2023-11-06 PROCEDURE — 4010F PR ACE/ARB THEARPY RXD/TAKEN: ICD-10-PCS | Mod: CPTII,S$GLB,, | Performed by: SURGERY

## 2023-11-06 PROCEDURE — 1159F MED LIST DOCD IN RCRD: CPT | Mod: CPTII,S$GLB,, | Performed by: SURGERY

## 2023-11-06 PROCEDURE — 3044F HG A1C LEVEL LT 7.0%: CPT | Mod: CPTII,S$GLB,, | Performed by: SURGERY

## 2023-11-06 PROCEDURE — 3008F BODY MASS INDEX DOCD: CPT | Mod: CPTII,S$GLB,, | Performed by: SURGERY

## 2023-11-06 PROCEDURE — 1159F PR MEDICATION LIST DOCUMENTED IN MEDICAL RECORD: ICD-10-PCS | Mod: CPTII,S$GLB,, | Performed by: SURGERY

## 2023-11-06 PROCEDURE — 3077F PR MOST RECENT SYSTOLIC BLOOD PRESSURE >= 140 MM HG: ICD-10-PCS | Mod: CPTII,S$GLB,, | Performed by: SURGERY

## 2023-11-06 PROCEDURE — 3079F PR MOST RECENT DIASTOLIC BLOOD PRESSURE 80-89 MM HG: ICD-10-PCS | Mod: CPTII,S$GLB,, | Performed by: SURGERY

## 2023-11-06 PROCEDURE — 3044F PR MOST RECENT HEMOGLOBIN A1C LEVEL <7.0%: ICD-10-PCS | Mod: CPTII,S$GLB,, | Performed by: SURGERY

## 2023-11-06 PROCEDURE — 3077F SYST BP >= 140 MM HG: CPT | Mod: CPTII,S$GLB,, | Performed by: SURGERY

## 2023-11-06 PROCEDURE — 99215 PR OFFICE/OUTPT VISIT, EST, LEVL V, 40-54 MIN: ICD-10-PCS | Mod: S$GLB,,, | Performed by: SURGERY

## 2023-11-06 PROCEDURE — 3008F PR BODY MASS INDEX (BMI) DOCUMENTED: ICD-10-PCS | Mod: CPTII,S$GLB,, | Performed by: SURGERY

## 2023-11-06 PROCEDURE — 3079F DIAST BP 80-89 MM HG: CPT | Mod: CPTII,S$GLB,, | Performed by: SURGERY

## 2023-11-06 PROCEDURE — 4010F ACE/ARB THERAPY RXD/TAKEN: CPT | Mod: CPTII,S$GLB,, | Performed by: SURGERY

## 2023-11-06 PROCEDURE — 99215 OFFICE O/P EST HI 40 MIN: CPT | Mod: S$GLB,,, | Performed by: SURGERY

## 2024-01-19 ENCOUNTER — TELEPHONE (OUTPATIENT)
Dept: TRANSPLANT | Facility: CLINIC | Age: 69
End: 2024-01-19
Payer: MEDICARE

## 2024-01-22 ENCOUNTER — TELEPHONE (OUTPATIENT)
Dept: TRANSPLANT | Facility: CLINIC | Age: 69
End: 2024-01-22
Payer: MEDICARE

## 2024-02-01 ENCOUNTER — TELEPHONE (OUTPATIENT)
Dept: TRANSPLANT | Facility: CLINIC | Age: 69
End: 2024-02-01
Payer: MEDICARE

## 2024-02-01 NOTE — TELEPHONE ENCOUNTER
----- Message from Mery De Luna MA sent at 1/30/2024  6:19 PM CST -----  Regarding: FW: return call to Nimisha  Contact: PT  [phone]    ----- Message -----  From: Jany Voss  Sent: 1/29/2024  11:24 AM CST  To: [facility] Non-Clinical  Subject: return call to Nimisha                             The patient called returning missed calls to schedule. Please reach out to sched    No further information provided      Patient can be contacted @# [phone]

## 2024-02-02 ENCOUNTER — TELEPHONE (OUTPATIENT)
Dept: TRANSPLANT | Facility: CLINIC | Age: 69
End: 2024-02-02
Payer: MEDICARE

## 2024-02-06 DIAGNOSIS — Z76.82 ORGAN TRANSPLANT CANDIDATE: Primary | ICD-10-CM

## 2024-02-14 ENCOUNTER — TELEPHONE (OUTPATIENT)
Dept: TRANSPLANT | Facility: CLINIC | Age: 69
End: 2024-02-14
Payer: MEDICARE

## 2024-02-14 NOTE — TELEPHONE ENCOUNTER
MA notes per Adherence form     FOR THE PAST THREE MONTHS:    0-AMA's  0-No-shows    No concerns with care giving, transportation, or mental health    Per adherence form son will help with transportation and is caregiver    Scanned in pt's media    Ailin Aparicio  Abdominal Transplant MA

## 2024-03-20 ENCOUNTER — HOSPITAL ENCOUNTER (OUTPATIENT)
Dept: RADIOLOGY | Facility: HOSPITAL | Age: 69
Discharge: HOME | End: 2024-03-20
Payer: MEDICARE

## 2024-03-20 ENCOUNTER — TELEPHONE (OUTPATIENT)
Dept: TRANSPLANT | Facility: CLINIC | Age: 69
End: 2024-03-20
Payer: MEDICARE

## 2024-03-20 ENCOUNTER — OFFICE VISIT (OUTPATIENT)
Dept: TRANSPLANT | Facility: CLINIC | Age: 69
End: 2024-03-20
Payer: MEDICARE

## 2024-03-20 VITALS
OXYGEN SATURATION: 98 % | TEMPERATURE: 98 F | HEIGHT: 67 IN | RESPIRATION RATE: 18 BRPM | SYSTOLIC BLOOD PRESSURE: 159 MMHG | WEIGHT: 159.38 LBS | DIASTOLIC BLOOD PRESSURE: 73 MMHG | BODY MASS INDEX: 25.01 KG/M2 | HEART RATE: 70 BPM

## 2024-03-20 DIAGNOSIS — Z76.82 ORGAN TRANSPLANT CANDIDATE: ICD-10-CM

## 2024-03-20 DIAGNOSIS — N18.6 END STAGE RENAL DISEASE: ICD-10-CM

## 2024-03-20 DIAGNOSIS — Z01.818 PRE-TRANSPLANT EVALUATION FOR KIDNEY TRANSPLANT: Primary | ICD-10-CM

## 2024-03-20 PROCEDURE — 1101F PT FALLS ASSESS-DOCD LE1/YR: CPT | Mod: CPTII,S$GLB,TXP, | Performed by: INTERNAL MEDICINE

## 2024-03-20 PROCEDURE — 99999 PR PBB SHADOW E&M-EST. PATIENT-LVL III: CPT | Mod: PBBFAC,TXP,, | Performed by: INTERNAL MEDICINE

## 2024-03-20 PROCEDURE — 93978 VASCULAR STUDY: CPT | Mod: TC,TXP

## 2024-03-20 PROCEDURE — 3078F DIAST BP <80 MM HG: CPT | Mod: CPTII,S$GLB,TXP, | Performed by: INTERNAL MEDICINE

## 2024-03-20 PROCEDURE — 76770 US EXAM ABDO BACK WALL COMP: CPT | Mod: 26,TXP,, | Performed by: RADIOLOGY

## 2024-03-20 PROCEDURE — 76700 US EXAM ABDOM COMPLETE: CPT | Mod: TC,TXP

## 2024-03-20 PROCEDURE — 1160F RVW MEDS BY RX/DR IN RCRD: CPT | Mod: CPTII,S$GLB,TXP, | Performed by: INTERNAL MEDICINE

## 2024-03-20 PROCEDURE — 99215 OFFICE O/P EST HI 40 MIN: CPT | Mod: S$GLB,TXP,, | Performed by: INTERNAL MEDICINE

## 2024-03-20 PROCEDURE — 72170 X-RAY EXAM OF PELVIS: CPT | Mod: 26,TXP,, | Performed by: RADIOLOGY

## 2024-03-20 PROCEDURE — 4010F ACE/ARB THERAPY RXD/TAKEN: CPT | Mod: CPTII,S$GLB,TXP, | Performed by: INTERNAL MEDICINE

## 2024-03-20 PROCEDURE — 71046 X-RAY EXAM CHEST 2 VIEWS: CPT | Mod: TC,TXP

## 2024-03-20 PROCEDURE — 3077F SYST BP >= 140 MM HG: CPT | Mod: CPTII,S$GLB,TXP, | Performed by: INTERNAL MEDICINE

## 2024-03-20 PROCEDURE — 99204 OFFICE O/P NEW MOD 45 MIN: CPT | Mod: S$GLB,TXP,, | Performed by: TRANSPLANT SURGERY

## 2024-03-20 PROCEDURE — 3066F NEPHROPATHY DOC TX: CPT | Mod: CPTII,S$GLB,TXP, | Performed by: INTERNAL MEDICINE

## 2024-03-20 PROCEDURE — 71046 X-RAY EXAM CHEST 2 VIEWS: CPT | Mod: 26,TXP,, | Performed by: RADIOLOGY

## 2024-03-20 PROCEDURE — 3288F FALL RISK ASSESSMENT DOCD: CPT | Mod: CPTII,S$GLB,TXP, | Performed by: INTERNAL MEDICINE

## 2024-03-20 PROCEDURE — 76700 US EXAM ABDOM COMPLETE: CPT | Mod: 26,TXP,, | Performed by: RADIOLOGY

## 2024-03-20 PROCEDURE — 72170 X-RAY EXAM OF PELVIS: CPT | Mod: TC,TXP

## 2024-03-20 PROCEDURE — 3044F HG A1C LEVEL LT 7.0%: CPT | Mod: CPTII,S$GLB,TXP, | Performed by: INTERNAL MEDICINE

## 2024-03-20 PROCEDURE — 1159F MED LIST DOCD IN RCRD: CPT | Mod: CPTII,S$GLB,TXP, | Performed by: INTERNAL MEDICINE

## 2024-03-20 PROCEDURE — 1126F AMNT PAIN NOTED NONE PRSNT: CPT | Mod: CPTII,S$GLB,TXP, | Performed by: INTERNAL MEDICINE

## 2024-03-20 PROCEDURE — 76770 US EXAM ABDO BACK WALL COMP: CPT | Mod: TC,TXP

## 2024-03-20 PROCEDURE — 99204 OFFICE O/P NEW MOD 45 MIN: CPT | Mod: S$GLB,TXP,, | Performed by: PHYSICIAN ASSISTANT

## 2024-03-20 PROCEDURE — 93978 VASCULAR STUDY: CPT | Mod: 26,TXP,, | Performed by: RADIOLOGY

## 2024-03-20 PROCEDURE — 3008F BODY MASS INDEX DOCD: CPT | Mod: CPTII,S$GLB,TXP, | Performed by: INTERNAL MEDICINE

## 2024-03-20 RX ORDER — INSULIN GLARGINE 100 [IU]/ML
10 INJECTION, SOLUTION SUBCUTANEOUS NIGHTLY
COMMUNITY

## 2024-03-20 NOTE — PROGRESS NOTES
Transplant Recipient Adult Psychosocial Assessment    Ernesto Diego  [address]  [address]  [place] [address]  Telephone Information:   Mobile [phone]   Mobile [phone]   Home  [phone] (home)  Work  There is no work phone number on file.  E-mail  No e-mail address on record    Sex: male  YOB: 1955  Age: 68 y.o.    Encounter Date: 3/20/2024  [place] Citizen: yes  Transportation: pt reports does know how to drive but does not have working car. Utilizes medical transportation for dialysis appointments. Walks for grocery and other errands.  Primary Language: English   Needed: no    Emergency Contact:  Ernesto Diego, 45 yo son, Charly GARCIA, does drive/own car, works full time at [employer] as . [phone]    Family/Social Support:   Number of dependents/: pt denies  Marital history: pt reports is not   Other family dynamics: Pt reports both parents are . Pt reports has 1 sister but is not in contact with her. Pt reports lives alone in assisted living apartment. Pt reports having 2 grown sons. Pt reports supportive son Ernesto Diego ([place]arly [place]) will be primary transplant caregiver. Pt reports 32 yo son Wagner Diego (Charly GARCIA) could be back up transplant caregiver although Wagner does not have a car; pt reports will need to utilize car service Uber/Lyft or cab transportation and patient reports understands it is patient's responsibility to arrange and afford transplant transportation.     Household Composition:  Pt reports lives alone.    Do you and your caregivers have access to reliable transportation? yes Pt reports primary transplant caregiver son Ernesto Diego does drive/own car. Pt reports other son Wagner Diego back up transplant caregiver does not have a car and patient reports will need to utilize car service Uber/Lyft or cab transportation if Wagner is transplant caregiver;  patient reports understands it is patient's responsibility to arrange and afford transplant transportation.     PRIMARY CAREGIVER: Ernesto Diego will be primary caregiver, phone number [phone]     provided in-depth information to patient and caregiver regarding pre- and post-transplant caregiver role.   strongly encourages patient and caregiver to have concrete plan regarding post-transplant care giving, including back-up caregiver(s) to ensure care giving needs are met as needed.    Patient and Caregiver states understanding all aspects of caregiver role/commitment and is able/willing/committed to being caregiver to the fullest extent necessary.    Patient and Caregiver verbalizes understanding of the education provided today and caregiver responsibilities.         remains available. Patient and Caregiver agree to contact  in a timely manner if concerns arise.      Able to take time off work without financial concerns: yes.     Additional Significant Others who will Assist with Transplant:  Wagner Diego, 32 yo son, [place], does drive/does NOT have a car, is . [phone]    Living Will: no  Healthcare Power of : no  Advance Directives on file: <<no information> per medical record.  Verbally reviewed LW/HCPA information.   provided patient with copy of LW/HCPA documents and provided education on completion of forms.    Living Donors: Education and resource information given to patient.    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    Spouse/Significant Other Employment: pt reports is not .    Disabled: disabled  2015 due to diabetes, HTN and vertigo, history of falling. Pt reports ESRD on dialysis 2022    Monthly Income:  $900 disability and senior care  Able to afford all costs now and if transplanted, including medications: yes  Patient and Caregiver verbalizes understanding of personal responsibilities related to transplant costs and the importance of having a financial plan to ensure that patients transplant costs are fully covered.       provided fundraising information/education. Patient and Caregiververbalizes understanding.   remains available.    Insurance:   Payer/Plan Subscr  Sex Relation Sub. Ins. ID Effective Group Num   1. HU[employer] MANAGE GABY PERERA 1955 Male Self [id number]                                   [address] [address]   2. [place] GABY PERER[place] 1955 Male Self [id number]* 22                                    [address] [address]     Primary Insurance (for UNOS reporting): Public Insurance - Medicare & Choice Pt to contact [employer] about any transplant benefits for travel, meals and lodging.  Secondary Insurance (for UNOS reporting): Public Insurance - Medicaid  Patient and Caregiver verbalizes clear understanding that patient may experience difficulty obtaining and/or be denied insurance coverage post-surgery. This includes and is not limited to disability insurance, life insurance, health insurance, burial insurance, long term care insurance, and other insurances.      Patient and Caregiver also reports understanding that future health concerns related to or unrelated to transplantation may not be covered by patient's insurance.  Resources and information provided and reviewed.     Patient and Caregiver provides verbal permission to release any necessary information to outside resources for patient care and discharge planning.  Resources and information provided are reviewed.      [facility], [phone].   "Hemodialysis: Tues, Thurs and Sat, 3 hours    Dialysis Adherence: Patient and Caregiver reports high dialysis compliance with treatments and instructions within last 3 months.  Dialysis compliance update requested.    Infusion Service: patient utilizing? no  Home Health: patient utilizing? no  DME: no  Pulmonary/Cardiac Rehab: pt denies   ADLS:  Independent with self care, medication management. Pt reports utilizes medical transportation for dialysis. Reports walks for grocery and other errands.    Adherence:   Pt reports history of non compliance with medical care and medication due to not being able to afford. Pt reports having medical insurance and disability/prison income in place at this time. Pt reports is now compliant with all medical appointments and instructions within last 3 months.  Adherence education and counseling provided.     Per History Section:  Past Medical History:   Diagnosis Date    Hypertension     Type 2 diabetes mellitus with unspecified diabetic retinopathy without macular edema      Social History     Tobacco Use    Smoking status: Former     Types: Cigarettes    Smokeless tobacco: Never   Substance Use Topics    Alcohol use: Not Currently     Social History     Substance and Sexual Activity   Drug Use Not Currently     Social History     Substance and Sexual Activity   Sexual Activity Not on file       Per Today's Psychosocial:  Tobacco: not utilizing at this time.  Alcohol: former heavy alcohol abuser. Completed drug and alcohol rehab at "[facility]" in late 1980s and reports is sober since rehab completion. Pt reports plan to abstain.  Illicit Drugs/Non-prescribed Medications: former heavy crack cocaine use. Completed drug and alcohol rehab at "[facility]" in late 1980s and reports is sober since rehab completion. Pt reports plan to abstain. Illicit drug screen recommended for transplant evaluation.    Patient and Caregiver states clear understanding of the potential impact of " substance use as it relates to transplant candidacy and is aware of possible random substance screening.  Substance abstinence/cessation counseling, education and resources provided and reviewed.     Arrests/DWI/Treatment/Rehab: patient denies    Psychiatric History:    Mental Health: pt denies any struggles with mental health at this time and denies any need for mental health referral at this time.  Psychiatrist/Counselor: pt denies  Medications:  pt denies  Suicide/Homicide Issues: pt denies any si/hi history   Safety at home: pt reports living in safe home environment with no abuse at this time.    Knowledge: Patient and Caregiver states having clear understanding and realistic expectations regarding the potential risks and potential benefits of organ transplantation and organ donation and agrees to discuss with health care team members and support system members, as well as to utilize available resources and express questions and/or concerns in order to further facilitate the pt informed decision-making.  Resources and information provided and reviewed.    Patient and Caregiver is aware of [facility]'s affiliation and/or partnership with agencies in home health care, LTAC, SNF, [facility], and other hospitals and clinics.    Understanding: Patient and Caregiver reports having a clear understanding of the many lifetime commitments involved with being a transplant recipient, including costs, compliance, medications, lab work, procedures, appointments, concrete and financial planning, preparedness, timely and appropriate communication of concerns, abstinence (ETOH, tobacco, illicit non-prescribed drugs), adherence to all health care team recommendations, support system and caregiver involvement, appropriate and timely resource utilization and follow-through, mental health counseling as needed/recommended, and patient and caregiver responsibilities.  Social Service Handbook, resources and detailed educational information  provided and reviewed.  Educational information provided.    Patient and Caregiver also reports current and expected compliance with health care regime and states having a clear understanding of the importance of compliance.      Patient and Caregiver reports a clear understanding that risks and benefits may be involved with organ transplantation and with organ donation.       Patient and Caregiver also reports clear understanding that psychosocial risk factors may affect patient, and include but are not limited to feelings of depression, generalized anxiety, anxiety regarding dependence on others, post traumatic stress disorder, feelings of guilt and other emotional and/or mental concerns, and/or exacerbation of existing mental health concerns.  Detailed resources provided and discussed.      Patient and Caregiver agrees to access appropriate resources in a timely manner as needed and/or as recommended, and to communicate concerns appropriately.  Patient and Caregiver also reports a clear understanding of treatment options available.     Patient and Caregiver received education in a group setting.   reviewed education, provided additional information, and answered questions.    Feelings or Concerns: Pt reports high motivation to pursue kidney organ transplant at this time.    Coping: Identify Patient & Caregiver Strategies to [place]:   1. In the past, coping with major surgery and/or related stress - family support from sons; walk for exercise; belkis and prayer   2. Currently & Pre-transplant -  family support from sons; walk for exercise; belkis and prayer   3. At the time of surgery -  family support from sons; belkis and prayer   4. During post-Transplant & Recovery Period -  family support from sons; belkis and prayer    Goals: Pt reports hope for successful kidney organ transplant so he can discontinue dialysis. Patient referred to Vocational Rehabilitation.    Interview Behavior: Patient and Caregiver  "presents as alert and oriented x 4, pleasant, good eye contact, well groomed, recall good, concentration/judgement good, average intelligence, calm, communicative, cooperative, and asking and answering questions appropriately. Pt's highly supportive son Ernesto Diego in session with patient's permission.         Transplant Social Work - Candidacy  Assessment/Plan:     Psychosocial Suitability: Patient presents as low to medium risk candidate for kidney transplant at this time. Pt reports previous significant medical non compliance which greatly impacted patient's health resulting in patient being placed on dialysis; dialysis compliance update requested. Pt reports remote history of heavy alcohol and crack cocaine drug abuse; pt reports abstaining from alcohol and illicit drugs since "[facility]" rehab completion in late 1980s. Pt reports having organ transplant caregiver/transportation plan, medical insurance plan and plan to afford transplant costs all in place.    Recommendations/Additional Comments: Pt reports previous significant non compliance with medical care and medicines prior to being placed on dialysis. Pt reports high medical compliance with appointments and instructions, including dialysis, within last 3 months. Dialysis compliance update requested. Dialysis compliance update recommended, if possible, prior to organ transplant to confirm medical compliance. Pt reports former alcohol and drug abuser in 1980s. Patient reports completed rehab and has been sober since late 1980s rehab completion; pt reports plan to continue with abstinence. Illicit drug screen recommended for evaluation and prior to organ transplant. Pt reports primary transplant caregiver son Ernesto Diego does drive/own car. Pt reports other son Wagner Diego back up transplant caregiver does not have a car and patient reports will need to utilize car service Uber/Lyft or cab transportation if Wagner is transplant " caregiver; patient reports understands it is patient's responsibility to arrange and afford transplant transportation. Pt reports plan to contact [facility] about any transplant benefits for lodging, travel and meals.      SW recommends that pt conduct fundraising to assist pt with pay for cost of medications, food, gas, and other transplant related needs.  SW recommends that pt remain aware of potential mental health concerns and contact the team if any concerns arise.  SW recommends that pt remain abstinent from tobacco, ETOH, and drug use.  SW supports pt's continued adherence. SW remains available to answer any questions or concerns that arise as the pt moves through the transplant process.      Final determination of transplant candidacy will be reviewed by the selection committee.      Madelaine MARQUEZ \[facility]\""W

## 2024-03-20 NOTE — PROGRESS NOTES
Transplant Nephrology  Kidney Transplant Recipient Evaluation    Referring Physician: Oli No  Current Nephrologist: Oli No    Subjective:   CC:  Initial evaluation of kidney transplant candidacy.    HPI:  Mr. Diego is a 68 y.o. year old Black or  male who has presented to be evaluated as a potential kidney transplant recipient. ESRD secondary to unknown cause (thinks he had a kidney biopsy) who started HD on 1/2023.TThSa through [facility]. Tolerating it well. States that he makes more than 500 ml of urine in a day. Of note, patient is not a great historian. Reviewed records patient brought.     Had PNA in his 40's that required thoracentesis and chest tube placement. Was diagnosed with HTN and DM since then. Denies any DM retinopathy but admits to neuropathy. Currently on lantus 10 units daily    No hx of CAD or stoke. Denies having a cath or stress.  Lives alone and independent in his ALDs. Walks to grocery stores (20 min every day) as he does not have a car. Has some occasional SOB. Denies any CP or claudication. Came in wheelchair today due to distance and mild issues with balance due to bilateral bunions on his feet. No falls however.    Hx of hep C in the past that was treated. Heavy ETOH in the past and had a liver biopsy performed in 2009 (?) at [facility]. Denies any diagnosis of cirrhosis or any further follow up with hepatology    Records state that he had a DVT (year unknown) but patient denies being placed on any blood thinners in the past    Does not have a potential donor.     Other PMH: Chronic hip pain, BPH    PSH: abdominal hernia repair    Allergies: Lisinopril    Meds: Lantus, flomax, cozaar, clonidine    Social: smokes occasionally per son; stopped drinking for many years. No drugs. . Son to take care of him.     FH: no other family members with kidney dx; no cancer. Sister with lupus     Previous Transplant: no    Past Medical and Surgical History:   "Brandie  has a past medical history of Hypertension and Type 2 diabetes mellitus with unspecified diabetic retinopathy without macular edema.  He has a past surgical history that includes Dialysis fistula creation (03/17/2023).    Past Social and Family History: Mr. Diego reports that he has quit smoking. His smoking use included cigarettes. He has never used smokeless tobacco. He reports that he does not currently use alcohol. He reports that he does not currently use drugs. His family history is not on file.    Review of Systems   Constitutional:  Positive for appetite change. Negative for activity change, chills and fever.        Has gained some weight    HENT:  Negative for congestion, sneezing and sore throat.    Eyes:  Negative for pain and itching.   Respiratory:  Negative for apnea, cough, shortness of breath and wheezing.    Cardiovascular:  Negative for chest pain.   Gastrointestinal:  Negative for abdominal pain, constipation, diarrhea, nausea and vomiting.   Genitourinary:  Negative for difficulty urinating, dysuria and frequency.   Musculoskeletal:  Negative for back pain, joint swelling and neck pain.   Skin:  Negative for color change.   Neurological:  Negative for dizziness, light-headedness and headaches.   Psychiatric/Behavioral:  Negative for behavioral problems and confusion. The patient is nervous/anxious.        Objective:   Blood pressure (!) 159/73, pulse 70, temperature 97.7 °F (36.5 °C), temperature source Tympanic, resp. rate 18, height 5' 7.44" (1.713 m), weight 72.3 kg (159 lb 6.3 oz), SpO2 98 %.body mass index is 24.64 kg/m².    Physical Exam  Vitals reviewed.   Constitutional:       General: He is not in acute distress.     Appearance: Normal appearance. He is not ill-appearing or toxic-appearing.      Comments: Appears stated age   HENT:      Head: Normocephalic and atraumatic.      Right Ear: External ear normal.      Left Ear: External ear normal.      Nose: Nose normal.   Eyes:     " " General: No scleral icterus.     Conjunctiva/sclera: Conjunctivae normal.   Cardiovascular:      Rate and Rhythm: Normal rate and regular rhythm.      Pulses: Normal pulses.      Heart sounds: Murmur heard.      No friction rub.   Pulmonary:      Effort: Pulmonary effort is normal. No respiratory distress.      Breath sounds: Normal breath sounds. No wheezing or rhonchi.   Abdominal:      General: Bowel sounds are normal. There is no distension.      Palpations: Abdomen is soft.      Tenderness: There is no abdominal tenderness. There is no guarding.   Musculoskeletal:         General: No swelling or deformity. Normal range of motion.      Cervical back: Normal range of motion and neck supple. No tenderness.      Right lower leg: No edema.      Left lower leg: No edema.   Skin:     General: Skin is warm and dry.      Coloration: Skin is not jaundiced.      Findings: No erythema or rash.   Neurological:      General: No focal deficit present.      Mental Status: He is alert and oriented to person, place, and time.      Motor: No weakness.   Psychiatric:         Mood and Affect: Mood normal.         Behavior: Behavior normal.         Labs:  Lab Results   Component Value Date    WBC 3.93 03/20/2024    HGB 11.5 (L) 03/20/2024    HCT 36.3 (L) 03/20/2024    LABPLAT 235 06/30/2023     03/20/2024    K 4.3 03/20/2024     03/20/2024    CO2 26 03/20/2024    BUN 26 (H) 03/20/2024    CREATININE 6.7 (H) 03/20/2024    EGFRNORACEVR 8.5 (A) 03/20/2024    GLUCOSE 99 11/27/2018    CALCIUM 9.3 03/20/2024    PHOS 3.8 03/20/2024    ALBUMIN 3.5 03/20/2024    AST 12 03/20/2024    ALT 8 (L) 03/20/2024    .3 (H) 03/20/2024       Lab Results   Component Value Date    BILIRUBINUA Negative 06/30/2023    PROTEINUA 200 (A) 06/30/2023    NITRITE Negative 06/30/2023    RBCUA 0-2 06/30/2023    WBCUA 0-2 06/30/2023       No results found for: "HLAABCTYPE"    Labs were reviewed with the patient.    Assessment and Plan    68 yr old " AAM with ESRD secondary to likely DM/HTN who is here to be evaluated for a potential kidney transplant recipient    1) ESRD    2) DM     3) HTN    4) hx of hep s/p liver biopsy: reviewed labs and noted to have normal LFT, platelets, INR    5) hx of DVT?     6) BPH    Transplant Candidacy:   Based on available information, Mr. Diego is a high-risk kidney transplant candidate. Will need the following:   -  check Hep C viral load and assess for cirrhosis on US abdomen- if noted, will need a hepatology follow up.   - attempt to obtain liver biopsy at [facility] (~2009). Can be presented without it.    - obtain results of colonoscopy (obtained ~3 yrs ago)   - discussed with patient regarding living donations as well as multiple listing at other transplant centers.     Meets center eligibility for accepting HCV+ donor offer - Yes.  Patient educated on HCV+ donors. Ernesto is willing to accept HCV+ donor offer - Yes   Patient is a candidate for KDPI > 85 kidney donor offer - Yes.  Final determination of transplant candidacy will be made once workup is complete and reviewed by the selection committee.    Patient advised that it is recommended that all transplant candidates, and their close contacts and household members receive Covid vaccination.    UNOS Patient Status  Functional Status: 80% - Normal activity with effort: some symptoms of disease    Any Previous Malignancy: no   Exhausted Vascular Access: no  Exhausted Peritoneal Access: no  Torrie Garsia DO   Transplant Nephrology

## 2024-03-20 NOTE — PROGRESS NOTES
INITIAL PATIENT EDUCATION NOTE AA    Mr. Ernesto Diego was seen in pre-kidney transplant clinic for evaluation for kidney, kidney/pancreas or pancreas only transplant.  The patient attended an individual video education session that discussed/reviewed the following aspects of transplantation: evaluation including diagnostic and laboratory testing,(Chemistries, Hematology, Serologies including HIV and Hepatitis and HLA) required for transplantation and selection committee process, UNOS waitlist management/multiple listings, types of organs offered (KDPI < 85%, KDPI > 85%, PHS risk, DCD, HCV+, HIV+ for HIV+ recipients and enbloc/dual), financial aspects, surgical procedures, dietary instruction pre- and post-transplant, health maintenance pre- and post-transplant, post-transplant hospitalization and outpatient follow-up, potential to participate in a research protocol, and medication management and side effects.  A question and answer session was provided after the presentation.    The patient was seen by all members of the multi-disciplinary team to include: Nephrologist/BECKI, Surgeon, , Transplant Coordinator, , Pharmacist and Dietician (if applicable).    The patient reviewed and signed all consents for evaluation which were witnessed and sent to scanning into the [facility] chart.    The patient was given an education book and plan for further evaluation based on his individual assessment.      The Patient was educated on OPTN policy change regarding race based eGFR. For Black or  Americans, this eGFR could have shown that their kidneys were working better than they were.    Because of this change, we are looking at everyones record and assessing waiting time for people who are eligible. We will be reviewing your medical records and will notify you if you are eligible. We also encouraged patient to provide span 20 labs that are not in our electronic medical records    Reviewed  program requirement for complete COVID vaccination with documentation prior to listing.  COVID education information reviewed with patient. Patient encouraged to be up to date on all vaccinations.     The patient was informed that the transplant team would manage immediate post op pain. If the patient requires long term pain management, they will need to have that pain management addressed by their PCP or previous provider who wrote for long term pain medicines.    The patient was encouraged to call with any questions or concerns.

## 2024-03-20 NOTE — PROGRESS NOTES
PHARM.D. PRE-TRANSPLANT NOTE:    This patient's medication therapy was evaluated as part of his pre-transplant evaluation.      The following general pharmacologic concerns were noted: None    The following concerns for post-operative pain management were noted: None    The following pharmacologic concerns related to HCV therapy were noted: None      This patient's medication profile was reviewed for considerations for DAA Hepatitis C therapy:    [X]  No current inducers of CYP 3A4 or PGP  [X]  No amiodarone on this patient's EMR profile in the last 24 months  [X]  No past or current atrial fibrillation on this patient's EMR profile       Current Outpatient Medications   Medication Sig Dispense Refill    cloNIDine (CATAPRES) 0.2 MG tablet Take 0.2 mg by mouth 3 (three) times daily.      insulin (LANTUS SOLOSTAR U-100 INSULIN) glargine 100 units/mL SubQ pen Inject 10 Units into the skin every evening.      losartan (COZAAR) 100 MG tablet Take 100 mg by mouth once daily.      tamsulosin (FLOMAX) 0.4 mg Cap Take by mouth once daily.       No current facility-administered medications for this visit.           I am available for consultation and can be contacted, as needed by the other members of the Transplant team.

## 2024-03-20 NOTE — PROGRESS NOTES
Transplant Surgery  Kidney Transplant Recipient Evaluation    Referring Physician: Oli No  Current Nephrologist: Oli No    Subjective:     Reason for Visit: evaluate transplant candidacy    History of Present Illness: Ernesto Diego is a 68 y.o. year old male undergoing transplant evaluation.    Dialysis History: Ernesto is on hemodialysis.      Transplant History: N/A    Etiology of Renal Disease: Diabetes Mellitus - Type II (based on medical records from referral).    External provider notes reviewed: Yes    Review of Systems  Objective:     Physical Exam:  Constitutional:   Vitals reviewed: yes   Well-nourished and well-groomed: yes  Eyes:   Sclerae icteric: no   Extraocular movements intact: yes  GI:    Bowel sounds normal: yes   Tenderness: no    If yes, quadrant/location: not applicable   Palpable masses: no    If yes, quadrant/location: not applicable   Hepatosplenomegaly: no   Ascites: no   Hernia: no    If yes, type/location: not applicable   Surgical scars: yes    If yes, type/location: laparoscopic port sites  Resp:   Effort normal: yes   Breath sounds normal: yes    CV:   Regular rate and rhythm: yes   Heart sounds normal: yes   Femoral pulses normal: yes   Extremities edematous: no  Skin:   Rashes or lesions present: no    If yes, describe:not applicable   Jaundice:: no    Musculoskeletal:   Gait normal: yes   Strength normal: yes  Psych:   Oriented to person, place, and time: yes   Affect and mood normal: yes    Additional comments: not applicable    Diagnostics:  The following labs have been reviewed: CBC  CMP  INR  The following radiology images have been independently reviewed and interpreted: pending    Counseling: We provided Ernesto Diego with a group education session today.  We discussed kidney transplantation at length with him, including risks, potential complications, and alternatives in the management of his renal failure.  The discussion included complications related to anesthesia,  bleeding, infection, primary nonfunction, and ATN.  I discussed the typical postoperative course, length of hospitalization, the need for long-term immunosuppression, and the need for long-term routine follow-up.  I discussed living-donor and -donor transplantation and the relative advantages and disadvantages of each.  I also discussed average waiting times for both living donation and  donation.  I discussed national and center-specific survival rates.  I also mentioned the potential benefit of multicenter listing to candidates listed with centers within more than one organ procurement organization.  All questions were answered.    Patient advised that it is recommended that all transplant candidates, and their close contacts and household members receive Covid vaccination.    Final determination of transplant candidacy will be made once evaluation is complete and reviewed by the Kidney & Kidney/Pancreas Selection Committee.    Coronavirus disease (COVID-19) caused by severe acute respiratory virus coronavirus 2 (SARS-C0V 2) is associated with increased mortality in solid organ transplant recipients (SOT) compared to non-transplant patients. Vaccine responses to vaccination are depressed against SARS-CoV2 compared to normal individuals but improve with third vaccination doses. Vaccination prior to SOT provides both the best opportunity for transplant candidates to develop protective immunity and to reduce the risk of serious COVID19 infections post transplantation. Organ transplant candidates at [facility] will be required to receive SARS-CoV-2 vaccination prior to being listed with a an active status, whenever possible. Exceptions will be made for disability related reasons or for sincerely held Zoroastrianism beliefs.          Transplant Surgery - Candidacy   Assessment/Plan:   Ernesto Diego has end stage renal disease (ESRD) on dialysis. I see no surgical  contraindication to placing a kidney transplant. Based on available information, Ernesto Diego is a suitable kidney transplant candidate.     Additional testing to be completed according to the Written Order Guidelines for Adult Pre-kidney and Pancreas Transplant Evaluation (KI-02).  Interpretation of tests and discussion of patient management involves all members of the multidisciplinary transplant team.    Manoj Brown Jr, MD

## 2024-03-20 NOTE — PROGRESS NOTES
PRE-TRANSPLANT INFECTIOUS DISEASE CONSULT    Reason for Visit:  Pre-transplant evaluation  Referring Provider: Dr. Oli No     History of Present Illness:    68 y.o. male with a history of kidney disease presents for pre-kidney transplant evaluation. Patient on HD. Denies history of dialysis related infections.     Infectious History:  Recent hospital admissions: No  Recent infections: No  Recent or current antibiotic use: No  History of recurrent infections: No  History of diabetic foot wound or bone/joint infection: No  Recent dental infections, issues or procedures: Yes. Poor dentition.  History of chicken pox: Yes  History of shingles: No  History of STI: History of HCV treated in [place]   History of COVID infection: Yes    History of Immunosuppression:  Prior chemotherapy / immunosuppression: No  Prior transplant: No  History of splenectomy: No    Tuberculosis:  Prior screening for latent TB: Yes  Prior diagnosis of latent TB: No  Risk factors for TB *known exposure, incarceration, homelessness*: No    Geographical exposures:  Currently lives in [place] alone  Lived in the following states: [place]  Lived or travelled to the [facility] [place]: No  International travel: [place] as a child   Travel-associated illness: No    Social/Environmental:  Occupation:  Retired; 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  No  Tobacco: Quit  Alcohol: None  Recreational drug use:  None  Sexual partners: yes      Past Histories:   Past Medical History:   Diagnosis Date    Hypertension     Type 2 diabetes mellitus with unspecified diabetic retinopathy without macular edema      Past Surgical History:   Procedure Laterality Date    DIALYSIS FISTULA CREATION  03/17/2023     History reviewed. No pertinent family history.  Social  History     Tobacco Use    Smoking status: Former     Types: Cigarettes    Smokeless tobacco: Never   Substance Use Topics    Alcohol use: Not Currently    Drug use: Not Currently     Review of patient's allergies indicates:   Allergen Reactions    Lisinopril Anaphylaxis           Current antibiotics:  Antibiotics (From admission, onward)      None              Review of Systems  Review of Systems   Constitutional: Negative for chills, fever, malaise/fatigue and night sweats.   HENT:  Negative for congestion and sore throat.    Eyes:  Negative for blurred vision and visual disturbance.   Cardiovascular:  Negative for chest pain and leg swelling.   Respiratory:  Negative for cough, shortness of breath and sputum production.    Skin:  Negative for color change, dry skin and itching.   Musculoskeletal:  Negative for back pain, joint pain and joint swelling.   Gastrointestinal:  Negative for abdominal pain, diarrhea, heartburn, nausea and vomiting.   Genitourinary:  Negative for dysuria, flank pain and hematuria.   Neurological:  Negative for dizziness, numbness and weakness.   Psychiatric/Behavioral:  Negative for altered mental status and depression. The patient is not nervous/anxious.           Objective  Physical Exam  Constitutional:       General: He is not in acute distress.     Appearance: Normal appearance. He is well-developed. He is not ill-appearing or diaphoretic.   HENT:      Head: Normocephalic and atraumatic.      Right Ear: External ear normal.      Left Ear: External ear normal.      Nose: Nose normal.   Eyes:      General: No scleral icterus.        Right eye: No discharge.         Left eye: No discharge.      Extraocular Movements: Extraocular movements intact.      Conjunctiva/sclera: Conjunctivae normal.   Pulmonary:      Effort: Pulmonary effort is normal. No respiratory distress.      Breath sounds: No stridor.   Skin:     General: Skin is dry.      Coloration: Skin is not jaundiced or pale.       "Findings: No erythema.   Neurological:      General: No focal deficit present.      Mental Status: He is alert and oriented to person, place, and time. Mental status is at baseline.   Psychiatric:         Mood and Affect: Mood normal.         Behavior: Behavior normal.         Thought Content: Thought content normal.         Judgment: Judgment normal.           Labs:    CBC:   Lab Results   Component Value Date    WBC 3.93 03/20/2024    HGB 11.5 (L) 03/20/2024    HCT 36.3 (L) 03/20/2024    MCV 98 03/20/2024     03/20/2024    GRAN 2.6 03/20/2024    GRAN 66.9 03/20/2024    LYMPH 0.6 (L) 03/20/2024    LYMPH 16.0 (L) 03/20/2024    MONO 0.5 03/20/2024    MONO 11.5 03/20/2024    EOSINOPHIL 4.8 03/20/2024       Syphilis screening: No results found for: "RPR", "PRPQ", "FTAABS"     TB screening: No results found for: "TBGOLDPLUS", "TSPOTSCREN"    HIV screening:   Lab Results   Component Value Date    YIU24XIJK Non-reactive 03/20/2024       Strongyloides IgG: No results found for: "STRONGANTIGG"    Hepatitis Serologies:   Lab Results   Component Value Date    HEPAIGG Reactive 03/20/2024    HEPBCAB Non-reactive 03/20/2024    HEPBSAB 9.80 03/20/2024    HEPCAB Reactive (A) 03/20/2024        Varicella IgG: No results found for: "VARICELLAINT"      Immunization History:  Received all childhood vaccines: Yes  All household members receive annual flu vaccine: No  All household members are up to date on COVID vaccine: No    Immunization History   Administered Date(s) Administered    COVID-19 MRNA, LN-S PF (MODERNA HALF 0.25 ML DOSE) 03/29/2022    COVID-19, MRNA, LN-S, PF (MODERNA FULL 0.5 ML DOSE) 08/24/2021, 09/21/2021    Hepatitis A, Adult 10/24/2017    Hepatitis B, Adult 11/02/2017, 05/03/2018, 06/04/2018    Pneumococcal Polysaccharide - 23 Valent 11/03/2009    Tdap 08/21/2018   Shingrix - never  Influenza - years ago  Prevnar 20 - never    Assessment and Plan    1. Risks of Infection: Available serologies were reviewed. No " unusual risks of infection or significant barriers to transplantation were identified from the infectious disease standpoint given the information available at this time.    - Acute infectious issues: None   - Pending serologies: Quantiferon gold / T-spot, RPR, Strongyloides IgG, VZV IgG, HCV viral load   - Please call if any pending serologic testing is positive.    2. Immunizations:  Based on the patient's immunization history and serologies, the following immunizations are recommended:  - Hepatitis A    Patient does have immunity to hepatitis A    Vaccination ordered today: No. Reason for not ordering: immunity demonstrated on serology   - Hepatitis B    Patient does have immunity to hepatitis B    Vaccination ordered today: No. Reason for not ordering: Immunity   - COVID    Current [facility] vaccination recommendations were discussed with the patient   - Annual high dose influenza     Vaccination ordered today: No. Reason for not ordering: patient declined   - Prevnar 20    Vaccination ordered today: Yes   - Tdap    Vaccination ordered today: No. Reason for not ordering: vaccination up to date   - Shingrix    Vaccination ordered today: Yes    Recommended Pre-Transplant Immunization Schedule   Vaccine  0m 1m 2m 6m   Pneumococcal conjugate vaccine (Prevnar 20) X      Tetanus-diphtheria-pertussis (Tdap)* X      Hepatitis A Vaccine (Havrix)** X   X   Hepatitis B Vaccine (Heplisav)** X X     Influenza (annual) X      Zoster Recombinant Vaccine (Shingrix) X  X           *Administer booster every 10 years.       **Administer if no immunity demonstrated on serologies               Patient will receive vaccines at local pharmacy. A written prescription was provided for all vaccine doses.     3. Counseling:   I discussed with the patient the risk for increased susceptibility to infections following transplantation including increased risk for infection right after transplant and if rejection should occur.  The patient has been  counseled on the importance of vaccinations to decrease risk of infection and severe illness. Specific guidance has been provided to the patient regarding the patient's occupation, hobbies and activities to avoid future infectious complications.     4. Transplant Candidacy: Based on available information, there are no identified significant barriers to transplantation from an infectious disease standpoint.  Final determination of transplant candidacy will be made once evaluation is complete and reviewed by the Selection Committee.      Follow up with infectious disease as needed.       The total time for evaluation and management services performed on 03/20/2024 was greater than 45 minutes.

## 2024-03-20 NOTE — TELEPHONE ENCOUNTER
Reviewed pt transplant labs.  Pt to continue to follow-up with dialysis unit dietitians recommendations.  Fax sent requesting most recent nutrition note from dialysis unit RD.  Once this note is received it will be scanned into pt's chart.

## 2024-03-20 NOTE — LETTER
March 22, 2024        Oli No  [address]  [facility] [address]  [place] [address]  Phone: [phone]  Fax: [phone]             [facility] Bow[facility]- Transplant [facility]  [address]  [facility] [address]  Phone: [phone]   Patient: Ernesto Diego   MR Number: [id number]   YOB: 1955   Date of Visit: 3/20/2024       Dear Dr. Oli No    Thank you for referring Ernesto Diego to me for evaluation. Attached you will find relevant portions of my assessment and plan of care.    If you have questions, please do not hesitate to call me. I look forward to following Ernesto Diego along with you.    Sincerely,    Torrie Garsia, DO    Enclosure    If you would like to receive this communication electronically, please contact [email or web address] or [phone] to request [employer] Link access.    [employer] Link is a tool which provides read-only access to select patient information with whom you have a relationship. Its easy to use and provides real time access to review your patients record including encounter summaries, notes, results, and demographic information.    If you feel you have received this communication in error or would no longer like to receive these types of communications, please e-mail [email or web address]

## 2024-03-25 ENCOUNTER — TELEPHONE (OUTPATIENT)
Dept: TRANSPLANT | Facility: CLINIC | Age: 69
End: 2024-03-25
Payer: MEDICARE

## 2024-03-25 DIAGNOSIS — F19.20: ICD-10-CM

## 2024-03-25 DIAGNOSIS — Z76.82 AWAITING ORGAN TRANSPLANT STATUS: Primary | ICD-10-CM

## 2024-03-25 NOTE — TELEPHONE ENCOUNTER
----- Message from Torrie Garsia DO sent at 3/21/2024 10:09 AM CDT -----  Regarding: FW: need illicit drug test  Gigi Field,   Can we order the drug screen as recommended by social work. Thanks    Torrie  ----- Message -----  From: Madelaine Benitez  Sent: 3/20/2024   1:37 PM CDT  To: Torrie Garsia DO  Subject: need illicit drug test                           Hello, please order an illicit drug screen for this patient's kidney organ transplant evaluation due to his history of alcohol and crack cocaine use.  Thank you!  Madelaine

## 2024-03-27 ENCOUNTER — DOCUMENTATION ONLY (OUTPATIENT)
Dept: TRANSPLANT | Facility: CLINIC | Age: 69
End: 2024-03-27
Payer: MEDICARE

## 2024-04-22 ENCOUNTER — TELEPHONE (OUTPATIENT)
Dept: TRANSPLANT | Facility: CLINIC | Age: 69
End: 2024-04-22
Payer: MEDICARE

## 2024-04-22 DIAGNOSIS — Z76.82 AWAITING ORGAN TRANSPLANT STATUS: Primary | ICD-10-CM

## 2024-04-22 DIAGNOSIS — B19.20 HEPATITIS C VIRUS INFECTION WITHOUT HEPATIC COMA, UNSPECIFIED CHRONICITY: ICD-10-CM

## 2024-04-22 NOTE — TELEPHONE ENCOUNTER
----- Message from Shelley Brunner RN sent at 4/10/2024 10:17 PM CDT -----    ----- Message -----  From: Elise Pack NP  Sent: 3/20/2024  12:43 PM CDT  To: [facility] Pre-Kidney Transplant Clinical    HCV AB reactive  Obtain HCV DNA, ABD US, and hepatology clearance

## 2024-04-24 ENCOUNTER — TELEPHONE (OUTPATIENT)
Dept: HEPATOLOGY | Facility: CLINIC | Age: 69
End: 2024-04-24
Payer: MEDICARE

## 2024-04-24 NOTE — TELEPHONE ENCOUNTER
Elise Pack NP ordered that patient be scheduled for a hepatology consult visit for kidney transplant clearance.  Patient hep c antibody positive but quant negative.  Attempt made to reach patient to setup consult visit with PA Scheuermann.  I left a VM asking that patient call for scheduling.

## 2024-05-03 NOTE — TELEPHONE ENCOUNTER
No response.  Attempt made to reach patient again.  I left a VM asking that he call hepatology for scheduling.

## 2024-05-06 NOTE — TELEPHONE ENCOUNTER
Attempt made to reach patient again for scheduling with hepatology.  I left a VM and sent a letter asking that patient call for scheduling.

## 2024-05-15 ENCOUNTER — TELEPHONE (OUTPATIENT)
Dept: TRANSPLANT | Facility: CLINIC | Age: 69
End: 2024-05-15
Payer: MEDICARE

## 2024-05-15 DIAGNOSIS — Z76.82 ORGAN TRANSPLANT CANDIDATE: Primary | ICD-10-CM

## 2024-05-16 NOTE — TELEPHONE ENCOUNTER
No response from patient.  Attempt made to reach him again to setup consult visit with PA Scheuermann.  I left a VM asking that he call hepatology back for scheduling.   08-Apr-2019

## 2024-07-02 ENCOUNTER — TELEPHONE (OUTPATIENT)
Dept: TRANSPLANT | Facility: CLINIC | Age: 69
End: 2024-07-02
Payer: MEDICARE

## 2024-07-02 NOTE — TELEPHONE ENCOUNTER
----- Message from Marion Vasques sent at 7/2/2024 10:34 AM CDT -----  Regarding: Questions      Name Of Caller:   Ernesto      Contact Preference:   [phone]       Nature of call:    Requesting to speak with Jax Apodaca. He has some questions about a letter he received in the mail.

## 2024-07-02 NOTE — TELEPHONE ENCOUNTER
Spoke to pt regarding letter for incomplete tests. Pt stated he needs to cancel his appts for Friday and Monday because he is dealing with SOB right now. Trying to see what's the cause. He stated he think dialysis is taking too much off but dr says not taking enough. Pt advised once he is feeling better to call to get test sun'd.

## 2024-09-17 ENCOUNTER — TELEPHONE (OUTPATIENT)
Dept: TRANSPLANT | Facility: CLINIC | Age: 69
End: 2024-09-17
Payer: MEDICARE

## 2024-09-17 NOTE — TELEPHONE ENCOUNTER
----- Message from Miranda Bunch MA sent at 9/17/2024 11:59 AM CDT -----  Regarding: FW: Consult/Advisory  Contact: [phone]    ----- Message -----  From: Jennifer Elkins  Sent: 9/17/2024  11:42 AM CDT  To: [facility] Pre-Kidney Transplant Non-Clinical  Subject: Consult/Advisory                                 Consult/Advisory     Name Of Caller: pt         Contact Preference:   [phone]     Nature of call: Pt requesting call back in regards to the letter he has received on 9/13. Please call to advise

## 2024-11-01 ENCOUNTER — COMMITTEE REVIEW (OUTPATIENT)
Dept: TRANSPLANT | Facility: CLINIC | Age: 69
End: 2024-11-01
Payer: MEDICARE

## 2025-03-28 ENCOUNTER — OUTSIDE PLACE OF SERVICE (OUTPATIENT)
Dept: INTERVENTIONAL RADIOLOGY/VASCULAR | Facility: CLINIC | Age: 70
End: 2025-03-28
Payer: MEDICARE